# Patient Record
Sex: FEMALE | Race: BLACK OR AFRICAN AMERICAN | NOT HISPANIC OR LATINO | Employment: FULL TIME | URBAN - METROPOLITAN AREA
[De-identification: names, ages, dates, MRNs, and addresses within clinical notes are randomized per-mention and may not be internally consistent; named-entity substitution may affect disease eponyms.]

---

## 2017-03-22 ENCOUNTER — HOSPITAL ENCOUNTER (EMERGENCY)
Facility: HOSPITAL | Age: 32
Discharge: HOME | End: 2017-03-22
Attending: EMERGENCY MEDICINE
Payer: COMMERCIAL

## 2017-03-22 VITALS
RESPIRATION RATE: 19 BRPM | WEIGHT: 205 LBS | SYSTOLIC BLOOD PRESSURE: 119 MMHG | HEIGHT: 65 IN | DIASTOLIC BLOOD PRESSURE: 76 MMHG | BODY MASS INDEX: 34.16 KG/M2 | HEART RATE: 94 BPM | TEMPERATURE: 98 F | OXYGEN SATURATION: 99 %

## 2017-03-22 DIAGNOSIS — R11.0 NAUSEA: ICD-10-CM

## 2017-03-22 DIAGNOSIS — R51.9 HEADACHE IN FRONT OF HEAD: Primary | ICD-10-CM

## 2017-03-22 LAB
ALBUMIN SERPL BCP-MCNC: 4.3 G/DL
ALP SERPL-CCNC: 69 U/L
ALT SERPL W/O P-5'-P-CCNC: 17 U/L
ANION GAP SERPL CALC-SCNC: 11 MMOL/L
AST SERPL-CCNC: 17 U/L
B-HCG UR QL: NEGATIVE
BASOPHILS # BLD AUTO: 0.03 K/UL
BASOPHILS NFR BLD: 0.4 %
BILIRUB SERPL-MCNC: 0.3 MG/DL
BILIRUB UR QL STRIP: NEGATIVE
BUN SERPL-MCNC: 8 MG/DL
CALCIUM SERPL-MCNC: 9.7 MG/DL
CHLORIDE SERPL-SCNC: 109 MMOL/L
CLARITY UR: CLEAR
CO2 SERPL-SCNC: 19 MMOL/L
COLOR UR: YELLOW
CREAT SERPL-MCNC: 0.9 MG/DL
CTP QC/QA: YES
DIFFERENTIAL METHOD: ABNORMAL
EOSINOPHIL # BLD AUTO: 0.1 K/UL
EOSINOPHIL NFR BLD: 1.1 %
ERYTHROCYTE [DISTWIDTH] IN BLOOD BY AUTOMATED COUNT: 13 %
EST. GFR  (AFRICAN AMERICAN): >60 ML/MIN/1.73 M^2
EST. GFR  (NON AFRICAN AMERICAN): >60 ML/MIN/1.73 M^2
GLUCOSE SERPL-MCNC: 75 MG/DL
GLUCOSE UR QL STRIP: NEGATIVE
HCT VFR BLD AUTO: 44.3 %
HGB BLD-MCNC: 14.6 G/DL
HGB UR QL STRIP: NEGATIVE
KETONES UR QL STRIP: NEGATIVE
LEUKOCYTE ESTERASE UR QL STRIP: NEGATIVE
LYMPHOCYTES # BLD AUTO: 0.9 K/UL
LYMPHOCYTES NFR BLD: 12 %
MCH RBC QN AUTO: 30.4 PG
MCHC RBC AUTO-ENTMCNC: 33 %
MCV RBC AUTO: 92 FL
MONOCYTES # BLD AUTO: 0.3 K/UL
MONOCYTES NFR BLD: 3.4 %
NEUTROPHILS # BLD AUTO: 6.1 K/UL
NEUTROPHILS NFR BLD: 82.8 %
NITRITE UR QL STRIP: NEGATIVE
PH UR STRIP: 7 [PH] (ref 5–8)
PLATELET # BLD AUTO: 217 K/UL
PMV BLD AUTO: 11 FL
POCT GLUCOSE: 92 MG/DL (ref 70–110)
POTASSIUM SERPL-SCNC: 4.3 MMOL/L
PROT SERPL-MCNC: 8.2 G/DL
PROT UR QL STRIP: NEGATIVE
RBC # BLD AUTO: 4.8 M/UL
SODIUM SERPL-SCNC: 139 MMOL/L
SP GR UR STRIP: 1.02 (ref 1–1.03)
URN SPEC COLLECT METH UR: NORMAL
UROBILINOGEN UR STRIP-ACNC: NEGATIVE EU/DL
WBC # BLD AUTO: 7.34 K/UL

## 2017-03-22 PROCEDURE — 80053 COMPREHEN METABOLIC PANEL: CPT

## 2017-03-22 PROCEDURE — 85025 COMPLETE CBC W/AUTO DIFF WBC: CPT

## 2017-03-22 PROCEDURE — 81003 URINALYSIS AUTO W/O SCOPE: CPT

## 2017-03-22 PROCEDURE — 99283 EMERGENCY DEPT VISIT LOW MDM: CPT

## 2017-03-22 PROCEDURE — 82962 GLUCOSE BLOOD TEST: CPT

## 2017-03-22 PROCEDURE — 81025 URINE PREGNANCY TEST: CPT | Performed by: EMERGENCY MEDICINE

## 2017-03-22 RX ORDER — ONDANSETRON 4 MG/1
4 TABLET, FILM COATED ORAL EVERY 8 HOURS PRN
Qty: 12 TABLET | Refills: 0 | Status: SHIPPED | OUTPATIENT
Start: 2017-03-22 | End: 2019-09-06

## 2017-03-22 NOTE — ED TRIAGE NOTES
Pt arrives to ED via personal transportation with c/o urinary frequency, blurry vision, nausea and chills that began today. Also reports headache pta that has since resolved. Denies any other symptoms at this time.

## 2017-03-22 NOTE — DISCHARGE INSTRUCTIONS
Zofran for nausea.  Regular meals.  Lots of liquids.  Please follow-up with primary care doctor this week.  Rest.

## 2017-03-22 NOTE — ED PROVIDER NOTES
"Encounter Date: 3/22/2017       History     Chief Complaint   Patient presents with    Nausea     Pt reports feelings of nausea and dizziness since this morning. "I don't know if I may be a diabetic but after I drank coke and coffee this morning I started feeling dizzy and sick."      Review of patient's allergies indicates:  No Known Allergies  HPI   This 31-year-old female presents to the emergency room complaining of nausea and dizziness with some frontal head pain that started this morning after drinking a cup of coffee".  She is concerned that she may be diabetic.  She also had some dizziness.  She is essentially otherwise well she treated herself with aspirin for headache.  It is now gone.  Patient did have chills.  She is without other injuries or problems she reports that she is urinating a lot and she does have blurred vision.  History reviewed. No pertinent past medical history.  History reviewed. No pertinent surgical history.  Family History   Problem Relation Age of Onset    Diabetes Father     Hypertension Sister      Social History   Substance Use Topics    Smoking status: Former Smoker     Packs/day: 0.00    Smokeless tobacco: None    Alcohol use Yes      Comment: occasionally     Review of Systems  The patient was questioned specifically with regard to the following.  General: Fever, chills, sweats. Neuro: Headache. Eyes: eye problems. ENT: Ear pain, sore throat. Cardiovascular: Chest pain. Respiratory: Cough, shortness of breath. Gastrointestinal: Abdominal pain, vomiting, diarrhea. Genitourinary: Painful urination.  Musculoskeletal: Arm and leg problems. Skin: Rash.  The review of systems was negative except for the following: Headache, blurred vision, chills, dizziness, nausea  Physical Exam   Initial Vitals   BP Pulse Resp Temp SpO2   03/22/17 1317 03/22/17 1317 03/22/17 1317 03/22/17 1317 03/22/17 1317   117/79 92 18 98.1 °F (36.7 °C) 99 %     Physical Exam  The patient was examined " specifically for the following:   General:No significant distress, Good color, Warm and dry. Head and neck:Scalp atraumatic, Neck supple. Neurological:Appropriate conversation, Gross motor deficits. Eyes:Conjugate gaze, Clear corneas. ENT: No epistaxis. Cardiac: Regular rate and rhythm, Grossly normal heart tones. Pulmonary: Wheezing, Rales. Gastrointestinal: Abdominal tenderness, Abdominal distention. Musculoskeletal: Extremity deformity, Apparent pain with range of motion of the joints. Skin: Rash.   The findings on examination were normal .  Mental status examination, cranial nerves, motor and sensory examination are grossly unremarkable.  The neck is supple.  Conjunctiva and cornea are clear.  The abdomen is soft.  ED Course   Procedures  Labs Reviewed   COMPREHENSIVE METABOLIC PANEL - Abnormal; Notable for the following:        Result Value    CO2 19 (*)     All other components within normal limits   CBC W/ AUTO DIFFERENTIAL - Abnormal; Notable for the following:     Lymph # 0.9 (*)     Gran% 82.8 (*)     Lymph% 12.0 (*)     Mono% 3.4 (*)     All other components within normal limits   URINALYSIS   POCT URINE PREGNANCY   POCT GLUCOSE       medical decision making: The patient's laboratory work is essentially unremarkable.  I will discharge her to outpatient evaluation and treatment with primary care there is no evidence of diabetes anemia hepatic or renal failure.                          ED Course     Clinical Impression:   The primary encounter diagnosis was Headache in front of head. A diagnosis of Nausea was also pertinent to this visit.          Peter Paredes MD  03/24/17 1918

## 2017-03-22 NOTE — ED AVS SNAPSHOT
[facility]  [place] [address]               Kina Falcon   3/22/2017  2:14 PM   ED    Description:  Female : 1985   Department:  [facility]           Your Care was Coordinated By:     Provider Role From To    Peter Paredes MD Attending Provider [phone] --      Reason for Visit     Nausea           Diagnoses this Visit        Comments    Headache in front of head    -  Primary     Nausea           ED Disposition     None           To Do List           Follow-up Information     Follow up with Princess MARKO Ordaz MD In 3 days.    Specialties:  Internal Medicine, Pediatrics    Contact information:    [address]  [address]-  [facility] [address]  [phone]         These Medications        Disp Refills Start End    ondansetron (ZOFRAN) 4 MG tablet 12 tablet 0 3/22/2017     Take 1 tablet (4 mg total) by mouth every 8 (eight) hours as needed (Nausea and vomiting). - Oral    Pharmacy: [facility] [address] [place] [address] -  [address] AT [facility] [address] Ph #: [phone]         [facility]     [facility] Care Line -  Assistance  Registered nurses in the [facility] provide clinical advisement, health education, appointment booking, and other advisory services.  Call for this free service at [phone].             Medications           Message regarding Medications     Verify the changes and/or additions to your medication regime listed below are the same as discussed with your clinician today.  If any of these changes or additions are incorrect, please notify your healthcare provider.        START taking these NEW medications        Refills    ondansetron (ZOFRAN) 4 MG tablet 0    Sig: Take 1 tablet (4 mg total) by mouth every 8 (eight) hours as needed (Nausea and vomiting).    Class: Print    Route: Oral           Verify that the below list of medications is  "an accurate representation of the medications you are currently taking.  If none reported, the list may be blank. If incorrect, please contact your healthcare provider. Carry this list with you in case of emergency.           Current Medications     ondansetron (ZOFRAN) 4 MG tablet Take 1 tablet (4 mg total) by mouth every 8 (eight) hours as needed (Nausea and vomiting).           Clinical Reference Information           Your Vitals Were     BP Pulse Temp Resp Height Weight    117/79 (BP Location: Right arm, Patient Position: Sitting) 92 98.1 °F (36.7 °C) (Oral) 18 5' 5" (1.651 m) 93 kg (205 lb)    SpO2 BMI             99% 34.11 kg/m2         Allergies as of 3/22/2017     No Known Allergies      Immunizations Administered on Date of Encounter - 3/22/2017     None      ED Micro, Lab, POCT     Start Ordered       Status Ordering Provider    03/22/17 1418 03/22/17 1418  POCT glucose  Once      Acknowledged     03/22/17 1418 03/22/17 1418  Comprehensive metabolic panel  STAT      Final result     03/22/17 1418 03/22/17 1418  CBC auto differential  STAT      Final result     03/22/17 1418 03/22/17 1418  Urinalysis  STAT      Final result     03/22/17 1344 03/22/17 1343  POCT urine pregnancy  Once      Final result     03/22/17 1322 03/22/17 1322  POCT glucose  Once      Final result       ED Imaging Orders     None        Discharge Instructions       Zofran for nausea.  Regular meals.  Lots of liquids.  Please follow-up with primary care doctor this week.  Rest.    Smoking Cessation     If you would like to quit smoking:   You may be eligible for free services if you are a [place] resident and started smoking cigarettes before September 1, 1988.  Call the [employer]oking [employer]ess[employer] ([employer]) toll free at (200) 233-7554 or ([phone] 005-1853.   Call 6-800-QUIT-NOW if you do not meet the above criteria.             [facility] complies with applicable Federal civil rights laws and does not discriminate on the " basis of race, color, national origin, age, disability, or sex.        Language Assistance Services     ATTENTION: Language assistance services are available, free of charge. Please call [phone].      ATENCIÓN: Si habla rajan, tiene a braga disposición servicios gratuitos de asistencia lingüística. Llame al [phone].     CHÚ Ý: N?u b?n nói Ti?ng Vi?t, có các d?ch v? h? tr? ngôn ng? mi?n phí dành cho b?n. G?i s? [phone].

## 2017-06-18 ENCOUNTER — HOSPITAL ENCOUNTER (EMERGENCY)
Facility: HOSPITAL | Age: 32
Discharge: HOME | End: 2017-06-18
Attending: EMERGENCY MEDICINE
Payer: MEDICAID

## 2017-06-18 VITALS
WEIGHT: 209 LBS | HEIGHT: 66 IN | BODY MASS INDEX: 33.59 KG/M2 | SYSTOLIC BLOOD PRESSURE: 125 MMHG | TEMPERATURE: 99 F | HEART RATE: 98 BPM | OXYGEN SATURATION: 100 % | RESPIRATION RATE: 20 BRPM | DIASTOLIC BLOOD PRESSURE: 74 MMHG

## 2017-06-18 DIAGNOSIS — R10.9 ABDOMINAL CRAMPING: Primary | ICD-10-CM

## 2017-06-18 DIAGNOSIS — N94.3 PREMENSTRUAL SYNDROME: ICD-10-CM

## 2017-06-18 LAB
B-HCG UR QL: NEGATIVE
BILIRUB UR QL STRIP: NEGATIVE
CLARITY UR: CLEAR
COLOR UR: YELLOW
CTP QC/QA: YES
GLUCOSE UR QL STRIP: NEGATIVE
HGB UR QL STRIP: NEGATIVE
KETONES UR QL STRIP: NEGATIVE
LEUKOCYTE ESTERASE UR QL STRIP: NEGATIVE
NITRITE UR QL STRIP: NEGATIVE
PH UR STRIP: 7 [PH] (ref 5–8)
PROT UR QL STRIP: NEGATIVE
SP GR UR STRIP: 1.01 (ref 1–1.03)
URN SPEC COLLECT METH UR: NORMAL
UROBILINOGEN UR STRIP-ACNC: NEGATIVE EU/DL

## 2017-06-18 PROCEDURE — 81025 URINE PREGNANCY TEST: CPT | Performed by: EMERGENCY MEDICINE

## 2017-06-18 PROCEDURE — 99283 EMERGENCY DEPT VISIT LOW MDM: CPT | Mod: 25

## 2017-06-18 PROCEDURE — 25000003 PHARM REV CODE 250: Performed by: PHYSICIAN ASSISTANT

## 2017-06-18 PROCEDURE — 81003 URINALYSIS AUTO W/O SCOPE: CPT

## 2017-06-18 RX ORDER — IBUPROFEN 600 MG/1
600 TABLET ORAL EVERY 6 HOURS PRN
Qty: 20 TABLET | Refills: 0 | Status: SHIPPED | OUTPATIENT
Start: 2017-06-18 | End: 2017-06-18

## 2017-06-18 RX ORDER — IBUPROFEN 600 MG/1
600 TABLET ORAL EVERY 6 HOURS PRN
Qty: 20 TABLET | Refills: 0 | Status: SHIPPED | OUTPATIENT
Start: 2017-06-18 | End: 2018-04-30 | Stop reason: SDUPTHER

## 2017-06-18 RX ORDER — IBUPROFEN 600 MG/1
600 TABLET ORAL
Status: COMPLETED | OUTPATIENT
Start: 2017-06-18 | End: 2017-06-18

## 2017-06-18 RX ADMIN — IBUPROFEN 600 MG: 600 TABLET, FILM COATED ORAL at 11:06

## 2017-06-18 NOTE — ED TRIAGE NOTES
Lower abdominal cramping started yesterday morning. . No dysuria reported. LMP 5/24/17. Spotting last week, none since.

## 2017-06-18 NOTE — ED PROVIDER NOTES
"Encounter Date: 6/18/2017    SCRIBE #1 NOTE: IBridgett am scribing for, and in the presence of,  Kathy Eduardo PA-C . I have scribed the following portions of the note - Other sections scribed: HPI and ROS .       History     Chief Complaint   Patient presents with    Abdominal Pain     lower abdominal cramping x 2 days; LMP May 24; denies N/V/D, subjective fever last night; denies dysuria     Review of patient's allergies indicates:  No Known Allergies  CC: Abdominal Pain (lower abdominal cramping x 2 days; LMP May 24; denies N/V/D, subjective fever last night; denies dysuria).  History obtained from patient.  Pt arrived via personal transportation.     HPI: This 32 y.o. Female, who has no pertinent PMHx, presents to the ED for evaluation of intermittent suprapubic abdominal cramping since yesterday. Episodes last 5-10 minutes. She has had "1 drop" of blood from vagina since onset of abdominal pain and denies any copious vaginal bleeding. She states she has had similar episodes for the past three months. She further complains of breast pain, but notes this is consistent with upcoming menstrual cycle. LMP was 5/24/17. Pain was 8/10 last night, thus she took Ibuprofen PM at 12:30 am. This improved pain to 4/10. She denies fever, chills, nausea, vomiting, constipation, dysuria, abnormal vaginal discharge, or urinary frequency. LBM was yesterday and normal. She reports that she has had a pelvic exam for abdominal cramping in the past, but reports no abnormal findings. She has no history of abdominal surgeries.        The history is provided by the patient. No  was used.     History reviewed. No pertinent past medical history.  Past Surgical History:   Procedure Laterality Date    HERNIA REPAIR       Family History   Problem Relation Age of Onset    Diabetes Father     Hypertension Sister      Social History   Substance Use Topics    Smoking status: Former Smoker     Packs/day: 0.00    " Smokeless tobacco: Not on file    Alcohol use Yes      Comment: occasionally     Review of Systems   Constitutional: Negative for chills and fever.   HENT: Negative for rhinorrhea.    Eyes: Negative for redness.   Respiratory: Negative for cough.    Cardiovascular: Negative for chest pain.   Gastrointestinal: Positive for abdominal pain. Negative for constipation, diarrhea, nausea and vomiting.   Genitourinary: Positive for vaginal bleeding. Negative for difficulty urinating, dysuria and frequency.   Musculoskeletal:        (+) breast pain    Skin: Negative for rash.   Neurological: Negative for dizziness and headaches.       Physical Exam     Initial Vitals [06/18/17 1026]   BP Pulse Resp Temp SpO2   125/74 98 20 98.9 °F (37.2 °C) 100 %     Physical Exam    Nursing note and vitals reviewed.  Constitutional: She appears well-developed and well-nourished.   HENT:   Head: Normocephalic.   Right Ear: External ear normal.   Left Ear: External ear normal.   Nose: Nose normal.   Mouth/Throat: Oropharynx is clear and moist.   Eyes: Conjunctivae are normal.   Cardiovascular: Normal rate and regular rhythm. Exam reveals no gallop and no friction rub.    No murmur heard.  Pulmonary/Chest: Breath sounds normal. She has no wheezes. She has no rhonchi. She has no rales. Right breast exhibits tenderness. Right breast exhibits no skin change. Left breast exhibits tenderness. Left breast exhibits no skin change. There is no breast swelling.   Abdominal: Soft. Bowel sounds are normal. She exhibits no distension. There is tenderness in the suprapubic area. There is no rigidity, no rebound, no guarding, no CVA tenderness, no tenderness at McBurney's point and negative Gusman's sign.   Musculoskeletal: Normal range of motion.   Lymphadenopathy:     She has no cervical adenopathy.   Neurological: She is alert. She has normal strength. No cranial nerve deficit or sensory deficit.   Skin: Skin is warm and dry.   Psychiatric: She has a  normal mood and affect.         ED Course   Procedures  Labs Reviewed   URINALYSIS   POCT URINE PREGNANCY             Medical Decision Making:   Initial Assessment:   Patient is a 32-year-old female who presents for evaluation of intermittent suprapubic abdominal cramping and bilateral breast pain since yesterday. Pt has had similar episodes for the past three months prior to her menstrual cycles. She denies F/C, NVD, constipation, dysuria, vaginal discharge, urinary frequency or vaginal bleeding.  Patient is afebrile, well-appearing in no acute distress.  On exam, there is tenderness to palpation of the suprapubic region with no peritoneal signs. Diffuse TTP of bilateral breasts. UPT negative.  UA negative for infection. Pt denies any vaginal discharge- I doubt PID or STI. No history of abdominal surgeries, I considered but doubt bowel obstruction or acute surgical abdomen. Based on history and physical exam, I think this is premenstrual syndrome.  Patient was given ibuprofen in ED.  Discharge patient to home with ibuprofen.  OB/GYN follow-up in 2 days for further evaluation and management.  Return to the ER if develop worsening symptoms, heavy vaginal bleeding, dizziness, lightheadedness or vaginal discharge as needed.  I discussed this patient with Dr. Chowdhury and he agrees with the assessment and plan.             Scribe Attestation:   Scribe #1: I performed the above scribed service and the documentation accurately describes the services I performed. I attest to the accuracy of the note.    Attending Attestation:     Physician Attestation Statement for NP/PA:   I discussed this assessment and plan of this patient with the NP/PA, but I did not personally examine the patient. The face to face encounter was performed by the NP/PA.    Other NP/PA Attestation Additions:      Medical Decision Makin-year-old female presents complaining of suprapubic abdominal cramping and bilateral breast pain.  Physical  examination does reveal mild suprapubic tenderness and bilateral breast tenderness, but no other significant abnormalities.  Urinalysis unremarkable. Agree with above assessment and plan.          Physician Attestation for Scribe:  Physician Attestation Statement for Scribe #1: I, Kathy Eduardo PA-C, reviewed documentation, as scribed by Bridgett Carmona  in my presence, and it is both accurate and complete.                 ED Course     Clinical Impression:   The primary encounter diagnosis was Abdominal cramping. A diagnosis of Premenstrual syndrome was also pertinent to this visit.          Kathy Eduardo PA-C  06/18/17 2218       Peter Chowdhury III, MD  07/08/17 0003

## 2017-06-18 NOTE — DISCHARGE INSTRUCTIONS
You can take Ibuprofen as needed for your abdominal cramping. This is likely related to your menstrual cycle.    Please make follow up appointment with OBGYN for follow up in 2 days.     Return to ER if you develop worsening symptoms, heavy vaginal bleeding, if pain moves to right lower part of your abdomen, any new symptoms or as needed.

## 2017-07-20 ENCOUNTER — HOSPITAL ENCOUNTER (EMERGENCY)
Facility: HOSPITAL | Age: 32
Discharge: HOME | End: 2017-07-20
Attending: EMERGENCY MEDICINE
Payer: MEDICAID

## 2017-07-20 VITALS
TEMPERATURE: 99 F | SYSTOLIC BLOOD PRESSURE: 127 MMHG | OXYGEN SATURATION: 100 % | BODY MASS INDEX: 33.27 KG/M2 | WEIGHT: 207 LBS | HEIGHT: 66 IN | RESPIRATION RATE: 16 BRPM | HEART RATE: 81 BPM | DIASTOLIC BLOOD PRESSURE: 83 MMHG

## 2017-07-20 DIAGNOSIS — H10.11 ALLERGIC CONJUNCTIVITIS, RIGHT: Primary | ICD-10-CM

## 2017-07-20 LAB
B-HCG UR QL: NEGATIVE
CTP QC/QA: YES

## 2017-07-20 PROCEDURE — 99283 EMERGENCY DEPT VISIT LOW MDM: CPT

## 2017-07-20 PROCEDURE — 81025 URINE PREGNANCY TEST: CPT | Performed by: NURSE PRACTITIONER

## 2017-07-20 RX ORDER — KETOTIFEN FUMARATE 0.35 MG/ML
1 SOLUTION/ DROPS OPHTHALMIC 2 TIMES DAILY
Refills: 0 | COMMUNITY
Start: 2017-07-20 | End: 2019-09-06

## 2017-07-20 NOTE — DISCHARGE INSTRUCTIONS
Return to the Emergency department for any worsening or failure to improve, otherwise follow up with your primary care provider.

## 2017-07-20 NOTE — ED TRIAGE NOTES
Blurry vision right eye has had pain for a week today had yellow discharge feels like trash in eye

## 2017-07-20 NOTE — ED PROVIDER NOTES
Encounter Date: 7/20/2017       History     Chief Complaint   Patient presents with    Eye Pain     pain onset for several days, states I woke up this morning and I had eye jelly in it, feels like something in eye     CC: eye discomfort  HPI: Patient is a 32-year-old female who presents to the emergency department 2 days of right eye discomfort that is constant she reports that it feels as though there is a foreign body in the eye but she reports no photophobia she does report itching.  She also reports a sensation of jelly in the eye, but when questioned deeply about this demonstrates chemosis.    The history is provided by the patient. No  was used.     Review of patient's allergies indicates:  No Known Allergies  History reviewed. No pertinent past medical history.  Past Surgical History:   Procedure Laterality Date    HERNIA REPAIR       Family History   Problem Relation Age of Onset    Diabetes Father     Hypertension Sister      Social History   Substance Use Topics    Smoking status: Former Smoker     Packs/day: 0.00    Smokeless tobacco: Never Used    Alcohol use Yes      Comment: occasionally     Review of Systems   Constitutional: Negative for chills, fatigue and fever.   HENT: Negative for congestion, ear discharge, ear pain, postnasal drip, rhinorrhea, sinus pressure, sneezing, sore throat and voice change.    Eyes: Positive for pain and itching. Negative for photophobia, discharge, redness and visual disturbance.   Respiratory: Negative for cough, shortness of breath and wheezing.    Cardiovascular: Negative for chest pain, palpitations and leg swelling.   Gastrointestinal: Negative for abdominal pain, constipation, diarrhea, nausea and vomiting.   Endocrine: Negative for polydipsia, polyphagia and polyuria.   Genitourinary: Negative for dysuria, frequency, hematuria, urgency, vaginal bleeding, vaginal discharge and vaginal pain.   Musculoskeletal: Negative for arthralgias and  myalgias.   Skin: Negative for rash and wound.   Neurological: Negative for dizziness, seizures, syncope, weakness and numbness.   Hematological: Negative for adenopathy. Does not bruise/bleed easily.   Psychiatric/Behavioral: Negative for self-injury and suicidal ideas. The patient is not nervous/anxious.        Physical Exam     Initial Vitals [07/20/17 1149]   BP Pulse Resp Temp SpO2   127/83 81 16 99 °F (37.2 °C) 100 %      MAP       97.67         Physical Exam    Nursing note and vitals reviewed.  Constitutional: She appears well-developed and well-nourished.   HENT:   Head: Normocephalic and atraumatic.   Right Ear: External ear normal.   Left Ear: External ear normal.   Nose: Nose normal. No epistaxis.   Mouth/Throat: Oropharynx is clear and moist.   Eyes: Conjunctivae and EOM are normal. Pupils are equal, round, and reactive to light. Right eye exhibits chemosis. Right eye exhibits no discharge, no exudate and no hordeolum. No foreign body present in the right eye. Left eye exhibits no chemosis, no discharge, no exudate and no hordeolum. No foreign body present in the left eye. Right conjunctiva is not injected. Right conjunctiva has no hemorrhage. Left conjunctiva is not injected. Left conjunctiva has no hemorrhage. No scleral icterus. Right eye exhibits normal extraocular motion and no nystagmus. Left eye exhibits normal extraocular motion and no nystagmus. Right pupil is round and reactive. Left pupil is round and reactive. Pupils are equal.   Neck: Normal range of motion.   Abdominal: She exhibits no distension.   Musculoskeletal: Normal range of motion.   Neurological: She is alert and oriented to person, place, and time.   Skin: Skin is dry. Capillary refill takes less than 2 seconds.         ED Course   Procedures  Labs Reviewed   POCT URINE PREGNANCY             Medical Decision Making:   Initial Assessment:   32-year-old female presents with right eye discomfort, itching, foreign body  sensation  Differential Diagnosis:   ALLERGIC conjunctivitis, corneal abrasion, foreign body  ED Management:  Following a thorough history and physical, it was determined that the most likely diagnosis was ALLERGIC conjunctivitis because the patient did not have photophobia she was able to keep her eye open without frequent blinking, rubbing, tearing, or other symptoms of persistent foreign body or photophobia symptomatic of laceration or corneal abrasion.  She was discharged home with recommendation for [facility] following of visual screening.  Other:   I have discussed this case with another health care provider.       <> Summary of the Discussion: Care of the patient discussed with Dr. Maurice who agreed with the assessment and plan.                       ED Course     Clinical Impression:   The encounter diagnosis was Allergic conjunctivitis, right.    Disposition:   Disposition: Discharged  Condition: Stable                        Jeferson Araujo, LEIA  07/20/17 1233

## 2017-11-27 ENCOUNTER — HOSPITAL ENCOUNTER (EMERGENCY)
Facility: OTHER | Age: 32
Discharge: HOME | End: 2017-11-27
Attending: EMERGENCY MEDICINE
Payer: COMMERCIAL

## 2017-11-27 VITALS
TEMPERATURE: 97 F | HEIGHT: 65 IN | DIASTOLIC BLOOD PRESSURE: 77 MMHG | WEIGHT: 204 LBS | OXYGEN SATURATION: 100 % | RESPIRATION RATE: 18 BRPM | BODY MASS INDEX: 33.99 KG/M2 | SYSTOLIC BLOOD PRESSURE: 130 MMHG | HEART RATE: 99 BPM

## 2017-11-27 DIAGNOSIS — J30.1 ACUTE SEASONAL ALLERGIC RHINITIS DUE TO POLLEN: Primary | ICD-10-CM

## 2017-11-27 PROCEDURE — 99283 EMERGENCY DEPT VISIT LOW MDM: CPT

## 2017-11-27 RX ORDER — PREDNISONE 10 MG/1
10 TABLET ORAL DAILY
Qty: 5 TABLET | Refills: 0 | Status: SHIPPED | OUTPATIENT
Start: 2017-11-27 | End: 2017-12-02

## 2017-11-27 NOTE — ED PROVIDER NOTES
Encounter Date: 11/27/2017       History     Chief Complaint   Patient presents with    URI     reports cold symptoms and nasa; congestion x 2 days     The history is provided by the patient.   URI   The primary symptoms include sore throat and cough. Primary symptoms do not include fever, fatigue, headaches, ear pain, swollen glands, wheezing, abdominal pain, nausea, vomiting, myalgias, arthralgias or rash. The current episode started yesterday. This is a new problem.   The sore throat began yesterday. The sore throat has been unchanged since its onset. The sore throat is not accompanied by trouble swallowing, drooling, hoarse voice or stridor. The sore throat pain is at a severity of 2/10.   The cough began yesterday. The cough is non-productive. It is exacerbated by allergens.   Symptoms associated with the illness include congestion and rhinorrhea. The illness is not associated with chills, plugged ear sensation, facial pain or sinus pressure.     Review of patient's allergies indicates:  No Known Allergies  History reviewed. No pertinent past medical history.  Past Surgical History:   Procedure Laterality Date    HERNIA REPAIR       Family History   Problem Relation Age of Onset    Diabetes Father     Hypertension Sister      Social History   Substance Use Topics    Smoking status: Former Smoker     Packs/day: 0.00    Smokeless tobacco: Never Used    Alcohol use Yes      Comment: occasionally     Review of Systems   Constitutional: Negative.  Negative for chills, fatigue and fever.   HENT: Positive for congestion, rhinorrhea and sore throat. Negative for drooling, ear pain, hoarse voice, sinus pressure and trouble swallowing.    Eyes: Negative.    Respiratory: Positive for cough. Negative for wheezing and stridor.    Cardiovascular: Negative.    Gastrointestinal: Negative.  Negative for abdominal pain, nausea and vomiting.   Endocrine: Negative.    Genitourinary: Negative.    Musculoskeletal: Negative.   Negative for arthralgias and myalgias.   Skin: Negative.  Negative for rash.   Allergic/Immunologic: Negative.    Neurological: Negative.  Negative for headaches.   Hematological: Negative.    Psychiatric/Behavioral: Negative.    All other systems reviewed and are negative.      Physical Exam     Initial Vitals [11/27/17 1131]   BP Pulse Resp Temp SpO2   130/77 (!) 111 18 97.1 °F (36.2 °C) 100 %      MAP       94.67         Physical Exam    Nursing note and vitals reviewed.  Constitutional: Vital signs are normal. She appears well-developed. She is active and cooperative.   HENT:   Head: Normocephalic and atraumatic.   Right Ear: Tympanic membrane normal.   Left Ear: Tympanic membrane normal.   Nose: Mucosal edema and rhinorrhea present.   Mouth/Throat: Uvula is midline, oropharynx is clear and moist and mucous membranes are normal.   Eyes: Conjunctivae, EOM and lids are normal. Pupils are equal, round, and reactive to light.   Neck: Trachea normal and full passive range of motion without pain. Neck supple. No thyroid mass present.   Cardiovascular: Normal rate, regular rhythm, S1 normal, S2 normal, normal heart sounds, intact distal pulses and normal pulses.   Pulmonary/Chest: Effort normal and breath sounds normal.   Abdominal: Soft. Normal appearance, normal aorta and bowel sounds are normal.   Musculoskeletal: Normal range of motion.   Lymphadenopathy:     She has no axillary adenopathy.   Neurological: She is alert and oriented to person, place, and time.   Skin: Skin is warm, dry and intact.   Psychiatric: She has a normal mood and affect. Her speech is normal and behavior is normal. Judgment and thought content normal. Cognition and memory are normal.         ED Course   Procedures  Labs Reviewed - No data to display                            ED Course      Clinical Impression:   The encounter diagnosis was Acute seasonal allergic rhinitis due to pollen.                           Steve Pinto,  MD  11/27/17 1449

## 2018-04-30 ENCOUNTER — HOSPITAL ENCOUNTER (EMERGENCY)
Facility: HOSPITAL | Age: 33
Discharge: HOME | End: 2018-04-30
Attending: INTERNAL MEDICINE
Payer: MEDICAID

## 2018-04-30 VITALS
HEIGHT: 64 IN | HEART RATE: 101 BPM | OXYGEN SATURATION: 100 % | BODY MASS INDEX: 34.15 KG/M2 | RESPIRATION RATE: 18 BRPM | SYSTOLIC BLOOD PRESSURE: 149 MMHG | TEMPERATURE: 99 F | WEIGHT: 200 LBS | DIASTOLIC BLOOD PRESSURE: 91 MMHG

## 2018-04-30 DIAGNOSIS — M54.50 ACUTE BILATERAL LOW BACK PAIN WITHOUT SCIATICA: Primary | ICD-10-CM

## 2018-04-30 LAB
B-HCG UR QL: NEGATIVE
CTP QC/QA: YES

## 2018-04-30 PROCEDURE — 99283 EMERGENCY DEPT VISIT LOW MDM: CPT

## 2018-04-30 PROCEDURE — 81025 URINE PREGNANCY TEST: CPT | Performed by: INTERNAL MEDICINE

## 2018-04-30 RX ORDER — IBUPROFEN 800 MG/1
800 TABLET ORAL EVERY 8 HOURS PRN
Qty: 30 TABLET | Refills: 0 | Status: SHIPPED | OUTPATIENT
Start: 2018-04-30 | End: 2018-07-10

## 2018-04-30 NOTE — ED PROVIDER NOTES
Encounter Date: 4/30/2018       History     Chief Complaint   Patient presents with    Back Pain     pt c/o Back pain that increases with SOB, pt cough and pain with deep breathing     32-year-old female presents to the emergency department complaining of low back pain since going to a pool party over the weekend.  Denies urinary frequency, urgency or dysuria.  She states pain is worse when she rotates her trunk right or left.      The history is provided by the patient. No  was used.   Back Pain    This is a new problem. The current episode started two days ago. The problem occurs intermittently. The problem has been unchanged. Associated with: Swimming. The pain is present in the lumbar spine. The quality of the pain is described as aching. The pain does not radiate. The pain is at a severity of 3/10. The symptoms are aggravated by bending. The pain is the same all the time. Pertinent negatives include no chest pain. She has tried nothing for the symptoms. Risk factors include obesity.     Review of patient's allergies indicates:  No Known Allergies  History reviewed. No pertinent past medical history.  Past Surgical History:   Procedure Laterality Date    HERNIA REPAIR       Family History   Problem Relation Age of Onset    Diabetes Father     Hypertension Sister      Social History   Substance Use Topics    Smoking status: Former Smoker     Packs/day: 0.00    Smokeless tobacco: Never Used    Alcohol use Yes      Comment: occasionally     Review of Systems   Cardiovascular: Negative for chest pain.   Musculoskeletal: Positive for back pain.   All other systems reviewed and are negative.      Physical Exam     Initial Vitals [04/30/18 0639]   BP Pulse Resp Temp SpO2   (!) 149/91 101 18 98.5 °F (36.9 °C) 100 %      MAP       110.33         Physical Exam    Nursing note reviewed.  Constitutional: She appears well-developed and well-nourished.   HENT:   Head: Normocephalic and atraumatic.    Eyes: EOM are normal.   Neck: Normal range of motion.   Cardiovascular: Normal rate and regular rhythm.   Pulmonary/Chest: Breath sounds normal. No respiratory distress.   Abdominal: Soft. Bowel sounds are normal.   Musculoskeletal: Normal range of motion.   Lumbar paraspinal muscle pain upon movement without gross deformity   Neurological: She is alert and oriented to person, place, and time. She has normal strength.   Skin: Skin is warm.         ED Course   Procedures  Labs Reviewed   POCT URINE PREGNANCY             Medical Decision Making:   Initial Assessment:   32-year-old female presents to the emergency department complaining of low back pain since going to a pool party over the weekend.  Denies urinary frequency, urgency or dysuria.  She states pain is worse when she rotates her trunk right or left.  ED Management:  Patient was given instructions for musculoskeletal back pain and a prescription for ibuprofen.  She was advised to follow-up with her primary care physician tomorrow for reevaluation.                      Clinical Impression:   The encounter diagnosis was Acute bilateral low back pain without sciatica.    Disposition:   Disposition: Discharged  Condition: Stable                        Preet Herrera MD  05/07/18 3289

## 2018-07-10 ENCOUNTER — HOSPITAL ENCOUNTER (EMERGENCY)
Facility: HOSPITAL | Age: 33
Discharge: HOME | End: 2018-07-10
Attending: EMERGENCY MEDICINE
Payer: MEDICAID

## 2018-07-10 VITALS
BODY MASS INDEX: 33.99 KG/M2 | TEMPERATURE: 98 F | OXYGEN SATURATION: 100 % | HEIGHT: 65 IN | DIASTOLIC BLOOD PRESSURE: 78 MMHG | WEIGHT: 204 LBS | SYSTOLIC BLOOD PRESSURE: 118 MMHG | RESPIRATION RATE: 18 BRPM | HEART RATE: 94 BPM

## 2018-07-10 DIAGNOSIS — Z72.0 TOBACCO ABUSE: ICD-10-CM

## 2018-07-10 DIAGNOSIS — J01.00 ACUTE MAXILLARY SINUSITIS, RECURRENCE NOT SPECIFIED: Primary | ICD-10-CM

## 2018-07-10 LAB
B-HCG UR QL: NEGATIVE
CTP QC/QA: YES
CTP QC/QA: YES
S PYO RRNA THROAT QL PROBE: NEGATIVE

## 2018-07-10 PROCEDURE — 81025 URINE PREGNANCY TEST: CPT | Performed by: EMERGENCY MEDICINE

## 2018-07-10 PROCEDURE — 99283 EMERGENCY DEPT VISIT LOW MDM: CPT

## 2018-07-10 PROCEDURE — 87880 STREP A ASSAY W/OPTIC: CPT

## 2018-07-10 RX ORDER — IBUPROFEN 600 MG/1
600 TABLET ORAL EVERY 6 HOURS PRN
Qty: 20 TABLET | Refills: 0 | Status: SHIPPED | OUTPATIENT
Start: 2018-07-10 | End: 2019-09-06

## 2018-07-10 RX ORDER — LORATADINE 10 MG/1
10 TABLET ORAL DAILY
Qty: 60 TABLET | Refills: 0 | OUTPATIENT
Start: 2018-07-10 | End: 2019-02-15

## 2018-07-10 RX ORDER — AMOXICILLIN AND CLAVULANATE POTASSIUM 875; 125 MG/1; MG/1
1 TABLET, FILM COATED ORAL 2 TIMES DAILY
Qty: 20 TABLET | Refills: 0 | Status: SHIPPED | OUTPATIENT
Start: 2018-07-10 | End: 2018-07-20

## 2018-07-10 RX ORDER — FLUTICASONE PROPIONATE 50 MCG
1 SPRAY, SUSPENSION (ML) NASAL 2 TIMES DAILY
Qty: 15 G | Refills: 0 | Status: SHIPPED | OUTPATIENT
Start: 2018-07-10 | End: 2018-07-31

## 2018-07-10 NOTE — ED PROVIDER NOTES
Encounter Date: 7/10/2018       History     Chief Complaint   Patient presents with    Sore Throat     pt reports sore throat and cough x 1 week after starting back smoking(1 pack and a half a day)    Cough     x 1 week     33-year-old female chief complaint runny nose, congestion, and sore throat. Patient also reports a nonproductive cough.  Patient states her symptoms started after she return to smoking cigarettes 2 weeks ago.  Patient states she has been very stressed in her life and started smoking again now her sinuses are acting up.  Patient states she has sinus pain and pressure whenever she bends forward.  It hurts a lot when she swallows and there is a burning pain in the back of her throat.          Review of patient's allergies indicates:  No Known Allergies  History reviewed. No pertinent past medical history.  Past Surgical History:   Procedure Laterality Date    HERNIA REPAIR       Family History   Problem Relation Age of Onset    Diabetes Father     Hypertension Sister      Social History   Substance Use Topics    Smoking status: Former Smoker     Packs/day: 0.00    Smokeless tobacco: Never Used    Alcohol use Yes      Comment: occasionally     Review of Systems   Constitutional: Negative for fever.   HENT: Positive for congestion, sinus pain, sinus pressure and sneezing. Negative for sore throat.    Respiratory: Negative for shortness of breath.    Cardiovascular: Negative for chest pain.   Gastrointestinal: Negative for nausea.   Genitourinary: Negative for dysuria.   Musculoskeletal: Negative for back pain.   Skin: Negative for rash.   Neurological: Negative for weakness.   Hematological: Does not bruise/bleed easily.   All other systems reviewed and are negative.      Physical Exam     Initial Vitals [07/10/18 0744]   BP Pulse Resp Temp SpO2   118/78 94 18 97.9 °F (36.6 °C) 100 %      MAP       --         Physical Exam    Nursing note and vitals reviewed.  Constitutional: She appears  well-developed and well-nourished.   HENT:   Head: Normocephalic and atraumatic.   Right Ear: Tympanic membrane and external ear normal.   Left Ear: Tympanic membrane and external ear normal.   Nose: Mucosal edema and rhinorrhea present. Right sinus exhibits maxillary sinus tenderness. Left sinus exhibits maxillary sinus tenderness.   Mouth/Throat: Uvula is midline and oropharynx is clear and moist. No oropharyngeal exudate, posterior oropharyngeal edema or posterior oropharyngeal erythema.   Eyes: Conjunctivae and EOM are normal. Pupils are equal, round, and reactive to light. Right eye exhibits no discharge. Left eye exhibits no discharge.   Neck: Normal range of motion. Neck supple.   Cardiovascular: Normal rate, regular rhythm, normal heart sounds and intact distal pulses. Exam reveals no gallop and no friction rub.    No murmur heard.  Pulmonary/Chest: Breath sounds normal. No respiratory distress. She has no wheezes. She has no rhonchi. She has no rales. She exhibits no tenderness.   Abdominal: Soft. Bowel sounds are normal. She exhibits no distension and no mass. There is no tenderness. There is no rebound and no guarding.   Musculoskeletal: Normal range of motion. She exhibits no edema or tenderness.   Neurological: She is alert and oriented to person, place, and time. She has normal strength and normal reflexes. She displays normal reflexes. No cranial nerve deficit or sensory deficit.   Skin: Skin is warm and dry. No rash and no abscess noted. No erythema. No pallor.   Psychiatric: She has a normal mood and affect.         ED Course   Procedures  Labs Reviewed   POCT URINE PREGNANCY   POCT RAPID STREP A                            Medical decision making   Fill and take prescriptions as directed.  Return to the ED if symptoms worsen or do not resolve.   Answered questions and discussed discharge plan.    Patient feels much better and is ready for discharge.  Follow up with PCP in 1 days.          Clinical  Impression:   The primary encounter diagnosis was Acute maxillary sinusitis, recurrence not specified. A diagnosis of Tobacco abuse was also pertinent to this visit.                             Clare Chow DO  07/11/18 8525

## 2018-08-11 ENCOUNTER — HOSPITAL ENCOUNTER (EMERGENCY)
Facility: HOSPITAL | Age: 33
Discharge: HOME | End: 2018-08-11
Attending: EMERGENCY MEDICINE
Payer: MEDICAID

## 2018-08-11 VITALS
DIASTOLIC BLOOD PRESSURE: 79 MMHG | BODY MASS INDEX: 34.95 KG/M2 | SYSTOLIC BLOOD PRESSURE: 133 MMHG | HEART RATE: 102 BPM | TEMPERATURE: 98 F | WEIGHT: 210 LBS | OXYGEN SATURATION: 99 % | RESPIRATION RATE: 16 BRPM

## 2018-08-11 DIAGNOSIS — J06.9 UPPER RESPIRATORY TRACT INFECTION, UNSPECIFIED TYPE: Primary | ICD-10-CM

## 2018-08-11 LAB
B-HCG UR QL: NEGATIVE
CTP QC/QA: YES

## 2018-08-11 PROCEDURE — 96372 THER/PROPH/DIAG INJ SC/IM: CPT

## 2018-08-11 PROCEDURE — 63600175 PHARM REV CODE 636 W HCPCS: Performed by: NURSE PRACTITIONER

## 2018-08-11 PROCEDURE — 81025 URINE PREGNANCY TEST: CPT | Performed by: EMERGENCY MEDICINE

## 2018-08-11 PROCEDURE — 99284 EMERGENCY DEPT VISIT MOD MDM: CPT | Mod: 25

## 2018-08-11 RX ORDER — CETIRIZINE HYDROCHLORIDE 10 MG/1
10 TABLET ORAL DAILY PRN
Qty: 30 TABLET | Refills: 0 | OUTPATIENT
Start: 2018-08-11 | End: 2019-02-15

## 2018-08-11 RX ORDER — FLUTICASONE PROPIONATE 50 MCG
1 SPRAY, SUSPENSION (ML) NASAL 2 TIMES DAILY PRN
Qty: 15 G | Refills: 0 | OUTPATIENT
Start: 2018-08-11 | End: 2019-02-15

## 2018-08-11 RX ORDER — DEXAMETHASONE SODIUM PHOSPHATE 4 MG/ML
10 INJECTION, SOLUTION INTRA-ARTICULAR; INTRALESIONAL; INTRAMUSCULAR; INTRAVENOUS; SOFT TISSUE
Status: COMPLETED | OUTPATIENT
Start: 2018-08-11 | End: 2018-08-11

## 2018-08-11 RX ORDER — BENZONATATE 100 MG/1
100 CAPSULE ORAL 3 TIMES DAILY PRN
Qty: 30 CAPSULE | Refills: 0 | Status: SHIPPED | OUTPATIENT
Start: 2018-08-11 | End: 2018-08-21

## 2018-08-11 RX ADMIN — DEXAMETHASONE SODIUM PHOSPHATE 10 MG: 4 INJECTION, SOLUTION INTRAMUSCULAR; INTRAVENOUS at 12:08

## 2018-08-11 NOTE — ED PROVIDER NOTES
"  History  Chief Complaint   Patient presents with    URI     Pt states," Congestion, headache for three days. It is worse when I smoke."       History reviewed. No pertinent past medical history.    Past Surgical History:   Procedure Laterality Date    HERNIA REPAIR         Family History   Problem Relation Age of Onset    Diabetes Father     Hypertension Sister        Social History   Substance Use Topics    Smoking status: Heavy Tobacco Smoker     Packs/day: 1.50     Types: Cigarettes    Smokeless tobacco: Never Used    Alcohol use Yes      Comment: occasionally       Physical Exam  /79 (BP Location: Right arm, Patient Position: Sitting)   Pulse 102   Temp 98.4 °F (36.9 °C) (Oral)   Resp 16   Wt 95.3 kg (210 lb)   LMP 07/24/2018 (Approximate)   SpO2 99%   BMI 34.95 kg/m²     ED Course          "

## 2018-08-11 NOTE — ED PROVIDER NOTES
"Encounter Date: 8/11/2018       History     Chief Complaint   Patient presents with    URI     Pt states," Congestion, headache for three days. It is worse when I smoke."     A 33-year-old female who presents to the ED with nasal congestion, cough and headache for 3 days.  Patient denies fever or chills. Patient states the symptoms is worse when she smoke.  Patient has no significant medical history.  Patient denies using any over-the-counter meds.      The history is provided by the patient.   URI   The primary symptoms include cough. Primary symptoms do not include fever, abdominal pain, vomiting or rash. The problem has been gradually worsening.   The cough began 3 to 5 days ago. The cough is productive. It is exacerbated by smoking.   Symptoms associated with the illness include sinus pressure and congestion. The illness is not associated with chills.     Review of patient's allergies indicates:  No Known Allergies  History reviewed. No pertinent past medical history.  Past Surgical History:   Procedure Laterality Date    HERNIA REPAIR       Family History   Problem Relation Age of Onset    Diabetes Father     Hypertension Sister      Social History   Substance Use Topics    Smoking status: Heavy Tobacco Smoker     Packs/day: 1.50     Types: Cigarettes    Smokeless tobacco: Never Used    Alcohol use Yes      Comment: occasionally     Review of Systems   Constitutional: Negative.  Negative for chills and fever.   HENT: Positive for congestion and sinus pressure.    Eyes: Negative.    Respiratory: Positive for cough and shortness of breath. Negative for chest tightness.    Cardiovascular: Negative.  Negative for chest pain.   Gastrointestinal: Negative.  Negative for abdominal pain and vomiting.   Endocrine: Negative.    Genitourinary: Negative.  Negative for dysuria and hematuria.   Musculoskeletal: Negative.  Negative for back pain and neck pain.   Skin: Negative.  Negative for rash. "   Allergic/Immunologic: Negative for immunocompromised state.   Neurological: Negative.  Negative for weakness.   Hematological: Does not bruise/bleed easily.   Psychiatric/Behavioral: Negative.    All other systems reviewed and are negative.      Physical Exam     Initial Vitals [08/11/18 1126]   BP Pulse Resp Temp SpO2   133/79 102 16 98.4 °F (36.9 °C) 99 %      MAP       --         Physical Exam    Nursing note and vitals reviewed.  Constitutional: Vital signs are normal. She appears well-developed. She is cooperative. She does not appear ill.   HENT:   Head: Normocephalic and atraumatic.   Right Ear: Tympanic membrane and external ear normal.   Left Ear: Tympanic membrane and external ear normal.   Nose: Mucosal edema present. Left sinus exhibits frontal sinus tenderness.   Mouth/Throat: Uvula is midline, oropharynx is clear and moist and mucous membranes are normal.   Eyes: Conjunctivae and lids are normal. Pupils are equal, round, and reactive to light.   Neck: Normal range of motion. Neck supple.   Cardiovascular: Normal rate, regular rhythm, S1 normal, S2 normal and normal heart sounds. Exam reveals no gallop.    No murmur heard.  Pulmonary/Chest: Effort normal and breath sounds normal.   Abdominal: Soft. Normal appearance and bowel sounds are normal. There is no tenderness.   Musculoskeletal: Normal range of motion.   Neurological: She is alert and oriented to person, place, and time.   Skin: Skin is warm, dry and intact.   Psychiatric: She has a normal mood and affect. Thought content normal. Cognition and memory are normal.         ED Course   Procedures  Labs Reviewed   POCT URINE PREGNANCY          Imaging Results    None          Medical Decision Making:   Initial Assessment:   A 33-year-old female who presents to the ED with nasal congestion, cough and sinus pressure x3 days.  Patient denies fever or chills. Patient states symptoms are exacerbated by her smoking.  Patient has not tried  over-the-counter meds.  Patient recently treated for a sinus infection last month.  Differential Diagnosis:   Allergic rhinitis  Acute sinusitis  Upper respiratory infection  Clinical Tests:   Lab Tests: Ordered and Reviewed  ED Management:  Physical exam.  UPT negative.  Decadron 10 mg IM.  Discharge home with Zyrtec, Tessalon Perles and Flonase nasal spray.  Educated on smoking cessation.  Patient verbalized understanding.  Follow-up with PCP in 1-2 days.  Return ED for worsening of symptoms.                      Clinical Impression:   The encounter diagnosis was Upper respiratory tract infection, unspecified type.                             TRINY Ceballos  08/11/18 7216

## 2018-08-11 NOTE — ED NOTES
Neuro: AAOx3  Resp: Airway patent, respirations even/unlabored  Cardiac: Skin pink/warm/dry, pulses intact  Abdomen: Soft, non-tender to palpation, denies N/V/D  Musculoskeletal: Moves all extremities equally, ROM intact  Sinusitits, headache

## 2018-08-24 ENCOUNTER — HOSPITAL ENCOUNTER (EMERGENCY)
Facility: HOSPITAL | Age: 33
Discharge: HOME | End: 2018-08-24
Attending: INTERNAL MEDICINE
Payer: MEDICAID

## 2018-08-24 VITALS
HEIGHT: 64 IN | SYSTOLIC BLOOD PRESSURE: 151 MMHG | HEART RATE: 98 BPM | TEMPERATURE: 99 F | BODY MASS INDEX: 35 KG/M2 | RESPIRATION RATE: 18 BRPM | OXYGEN SATURATION: 100 % | DIASTOLIC BLOOD PRESSURE: 79 MMHG | WEIGHT: 205 LBS

## 2018-08-24 DIAGNOSIS — B37.0 ORAL THRUSH: ICD-10-CM

## 2018-08-24 DIAGNOSIS — B37.0 ORAL CANDIDIASIS: Primary | ICD-10-CM

## 2018-08-24 LAB
B-HCG UR QL: NEGATIVE
CTP QC/QA: YES

## 2018-08-24 PROCEDURE — 99283 EMERGENCY DEPT VISIT LOW MDM: CPT

## 2018-08-24 PROCEDURE — 81025 URINE PREGNANCY TEST: CPT | Performed by: INTERNAL MEDICINE

## 2018-08-24 RX ORDER — NYSTATIN 100000 [USP'U]/ML
500000 SUSPENSION ORAL 4 TIMES DAILY
Qty: 200 ML | Refills: 0 | Status: SHIPPED | OUTPATIENT
Start: 2018-08-24 | End: 2018-09-03

## 2018-08-25 NOTE — ED PROVIDER NOTES
Encounter Date: 8/24/2018       History     Chief Complaint   Patient presents with    Lip pain     pt presents with c/o pain to lower lip; pt reports dryness and cracked skin to lip; reports use of blistex did not help to control pain     33-year-old female presents to the emergency department complaining of white lesions to the  lower lip x2 days.  She states she has been biting her lip and GOOGLED her symptoms and is afraid she has AIDS.       The history is provided by the patient. No  was used.     Review of patient's allergies indicates:  No Known Allergies  No past medical history on file.  Past Surgical History:   Procedure Laterality Date    HERNIA REPAIR       Family History   Problem Relation Age of Onset    Diabetes Father     Hypertension Sister      Social History     Tobacco Use    Smoking status: Heavy Tobacco Smoker     Packs/day: 1.50     Types: Cigarettes    Smokeless tobacco: Never Used   Substance Use Topics    Alcohol use: Yes     Comment: occasionally    Drug use: No     Review of Systems   Constitutional: Negative for activity change, appetite change, chills, diaphoresis, fatigue, fever and unexpected weight change.   HENT: Negative for congestion, dental problem, facial swelling, postnasal drip, sore throat, trouble swallowing and voice change.         Lip lesions   Gastrointestinal: Negative for nausea and vomiting.   All other systems reviewed and are negative.      Physical Exam     Initial Vitals [08/24/18 2028]   BP Pulse Resp Temp SpO2   (!) 151/79 98 18 99 °F (37.2 °C) 100 %      MAP       --         Physical Exam    Nursing note and vitals reviewed.  Constitutional: She appears well-developed and well-nourished. No distress.   HENT:   Head: Normocephalic and atraumatic.   Right Ear: External ear normal.   Left Ear: External ear normal.   Three small patchy white lesions to the inner lower lip.  Lesions do not scrape off and are nontender to palpation    Eyes: Conjunctivae and EOM are normal.   Neck: Normal range of motion.   Cardiovascular: Normal rate and regular rhythm.   Pulmonary/Chest: Breath sounds normal. No respiratory distress.   Abdominal: Soft. Bowel sounds are normal.   Musculoskeletal: Normal range of motion.   Neurological: She is alert.   Skin: Skin is dry.   Psychiatric: She has a normal mood and affect. Thought content normal.         ED Course   Procedures  Labs Reviewed   POCT URINE PREGNANCY          Imaging Results    None          Medical Decision Making:   Initial Assessment:   33-year-old female presents to the emergency department complaining of white lesions to the  lower lip x2 days.  She states she has been biting her lip and GOOGLED her symptoms and is afraid she has AIDS.     ED Management:  Patient is given instructions for thrush and a prescription for nystatin.  She was advised to follow up with her primary care physician within the next 3 days for re-evaluation and to return to the emergency department if condition worsens.                      Clinical Impression:   The primary encounter diagnosis was Oral candidiasis. A diagnosis of Oral thrush was also pertinent to this visit.      Disposition:   Disposition: Discharged  Condition: Stable                        Preet Herrera MD  08/24/18 2100

## 2018-08-26 ENCOUNTER — HOSPITAL ENCOUNTER (EMERGENCY)
Facility: HOSPITAL | Age: 33
Discharge: HOME | End: 2018-08-26
Attending: EMERGENCY MEDICINE
Payer: MEDICAID

## 2018-08-26 VITALS
DIASTOLIC BLOOD PRESSURE: 65 MMHG | HEIGHT: 64 IN | HEART RATE: 83 BPM | OXYGEN SATURATION: 98 % | TEMPERATURE: 99 F | SYSTOLIC BLOOD PRESSURE: 117 MMHG | RESPIRATION RATE: 20 BRPM | WEIGHT: 205 LBS | BODY MASS INDEX: 35 KG/M2

## 2018-08-26 DIAGNOSIS — R51.9 RIGHT TEMPORAL HEADACHE: Primary | ICD-10-CM

## 2018-08-26 DIAGNOSIS — R42 DIZZY: ICD-10-CM

## 2018-08-26 DIAGNOSIS — R42 ORTHOSTATIC DIZZINESS: ICD-10-CM

## 2018-08-26 LAB
B-HCG UR QL: NEGATIVE
CTP QC/QA: YES
POCT GLUCOSE: 66 MG/DL (ref 70–110)

## 2018-08-26 PROCEDURE — 25000003 PHARM REV CODE 250: Performed by: PHYSICIAN ASSISTANT

## 2018-08-26 PROCEDURE — 96360 HYDRATION IV INFUSION INIT: CPT

## 2018-08-26 PROCEDURE — 82962 GLUCOSE BLOOD TEST: CPT

## 2018-08-26 PROCEDURE — 81025 URINE PREGNANCY TEST: CPT | Performed by: NURSE PRACTITIONER

## 2018-08-26 PROCEDURE — 93010 ELECTROCARDIOGRAM REPORT: CPT | Mod: ,,, | Performed by: INTERNAL MEDICINE

## 2018-08-26 PROCEDURE — 99284 EMERGENCY DEPT VISIT MOD MDM: CPT | Mod: 25

## 2018-08-26 PROCEDURE — 93005 ELECTROCARDIOGRAM TRACING: CPT

## 2018-08-26 RX ORDER — ACETAMINOPHEN 325 MG/1
650 TABLET ORAL
Status: COMPLETED | OUTPATIENT
Start: 2018-08-26 | End: 2018-08-26

## 2018-08-26 RX ORDER — ACETAMINOPHEN 325 MG/1
650 TABLET ORAL EVERY 6 HOURS PRN
Qty: 12 TABLET | Refills: 0 | Status: SHIPPED | OUTPATIENT
Start: 2018-08-26 | End: 2018-08-29

## 2018-08-26 RX ADMIN — SODIUM CHLORIDE 1000 ML: 0.9 INJECTION, SOLUTION INTRAVENOUS at 03:08

## 2018-08-26 RX ADMIN — ACETAMINOPHEN 650 MG: 325 TABLET, FILM COATED ORAL at 02:08

## 2018-08-26 NOTE — ED TRIAGE NOTES
Pt c/o frontal headache, dizziness x4 days. Also c/o right sided facial pain. Pt states she went to an ED Friday and given mouthwash for thrush and that's when the headache started. Describes headache as pressure, pain is 6/10. Denies N/V. Denies taking meds PTA

## 2018-08-26 NOTE — ED PROVIDER NOTES
"Encounter Date: 8/26/2018  This is a SORT/MSE of a 33 y.o. female presenting to the ED with c/o right sided headache that began upon awakening this morning. Care will be transferred to an alternate provider when patient is roomed for a full evaluation and final disposition. Patient is aware that he/she is awaiting a room in the emergency department, where another provider will review results, evaluate and treat as needed. CHRISTINA Freeman DNP    SCRIBE #1 NOTE: I, Miguel Ragland, am scribing for, and in the presence of,  Matt Resendiz PA-C. I have scribed the following portions of the note - Other sections scribed: HPI/ROS.       History     Chief Complaint   Patient presents with    Dizziness     reports dizziness since she started [facility] for thrush "Yea, I feel dizzy. Like dizzy. Blurry or something"    Headache     CC: Headache     HPI: This 33 y.o. female presents to the ED for an evaluation of acute onset R, temporal headache that began this morning upon wakening. Headache is mild and intermittent in intensity. This present episode feels similar to previous ones. Pt thinks it is related to the Nystatin she is currently taking for thrush. No prior tx at home for headache today. Pt also reports dizziness as if she is going to pass out. Dizziness is worse with movement changes. No hx of HIV, other immunocompromising issues, or cancer. No recent antibiotic or blood thinner use. No recent traumas. Otherwise, pt denies fever, chills, photophobia, seizures, numbness, weakness, chest pain, SOB, cough, or any other associated symptoms.       The history is provided by the patient. No  was used.     Review of patient's allergies indicates:  No Known Allergies  History reviewed. No pertinent past medical history.  Past Surgical History:   Procedure Laterality Date    HERNIA REPAIR       Family History   Problem Relation Age of Onset    Diabetes Father     " Hypertension Sister      Social History     Tobacco Use    Smoking status: Heavy Tobacco Smoker     Packs/day: 1.50     Types: Cigarettes    Smokeless tobacco: Never Used   Substance Use Topics    Alcohol use: Yes     Comment: occasionally    Drug use: No     Review of Systems   Constitutional: Negative for chills and fever.   HENT: Negative for congestion, ear pain, rhinorrhea and sore throat.    Eyes: Negative for photophobia, pain and visual disturbance.   Respiratory: Negative for cough and shortness of breath.    Cardiovascular: Negative for chest pain.   Gastrointestinal: Negative for abdominal pain, diarrhea, nausea and vomiting.   Genitourinary: Negative for dysuria.   Musculoskeletal: Negative for back pain and neck pain.   Skin: Negative for rash.   Neurological: Positive for dizziness and headaches. Negative for seizures, weakness and numbness.   All other systems reviewed and are negative.      Physical Exam     Initial Vitals [08/26/18 1345]   BP Pulse Resp Temp SpO2   138/82 (!) 111 20 98.5 °F (36.9 °C) 100 %      MAP       --         Physical Exam    Nursing note and vitals reviewed.  Constitutional: She appears well-developed and well-nourished. She is not diaphoretic. No distress.   HENT:   Head: Normocephalic and atraumatic.   Right Ear: Tympanic membrane, external ear and ear canal normal. No mastoid tenderness.   Left Ear: Tympanic membrane, external ear and ear canal normal. No mastoid tenderness.   Nose: Nose normal. No rhinorrhea. Right sinus exhibits no maxillary sinus tenderness and no frontal sinus tenderness. Left sinus exhibits no maxillary sinus tenderness and no frontal sinus tenderness.   Mouth/Throat: Uvula is midline and oropharynx is clear and moist. No trismus in the jaw. No dental abscesses or uvula swelling. No oropharyngeal exudate, posterior oropharyngeal edema, posterior oropharyngeal erythema or tonsillar abscesses.   No evidence of thrush or leukoplakia   Eyes:  Conjunctivae and EOM are normal. Right eye exhibits no discharge. Left eye exhibits no discharge.   Neck: Normal range of motion. No tracheal deviation present. No JVD present.   Cardiovascular: Normal rate, regular rhythm and normal heart sounds. Exam reveals no friction rub.    No murmur heard.  Pulmonary/Chest: Breath sounds normal. No stridor. No respiratory distress. She has no wheezes. She has no rhonchi. She has no rales. She exhibits no tenderness.   Musculoskeletal: Normal range of motion.   Lymphadenopathy:     She has no cervical adenopathy.   Neurological: She is alert and oriented to person, place, and time. She has normal strength. She displays no tremor. She displays no seizure activity. Coordination and gait normal.   Skin: Skin is warm and dry. No rash and no abscess noted. No erythema. No pallor.         ED Course   Procedures  Labs Reviewed   POCT GLUCOSE - Abnormal; Notable for the following components:       Result Value    POCT Glucose 66 (*)     All other components within normal limits   POCT URINE PREGNANCY          Imaging Results    None          Medical Decision Making:   History:   Old Medical Records: I decided to obtain old medical records.  Initial Assessment:   34 yo F with R temporal HA and dizziness.   Clinical Tests:   Lab Tests: Ordered and Reviewed  Medical Tests: Reviewed and Ordered  ED Management:  Orthostatic by way of tachycardia, and likely contributing to dizziness. Mild hypoglycemia may also be contributory. No arhythmia on EKG. I doubt intracranial hemorrhage and meningitis.     Pain controlled in ED. Dizziness resolves with fluid. Remains well appearing. Vitals stable. Advising PCP follow up. Strict return precautions discussed. Agreeable to plan.   Other:   I have discussed this case with another health care provider.       <> Summary of the Discussion: Discussed with attending            Scribe Attestation:   Scribe #1: I performed the above scribed service and the  documentation accurately describes the services I performed. I attest to the accuracy of the note.    Attending Attestation:           Physician Attestation for Scribe:  Physician Attestation Statement for Scribe #1: I, Matt Resendiz PA-C, reviewed documentation, as scribed by Miguel Ragland in my presence, and it is both accurate and complete.                    Clinical Impression:   The primary encounter diagnosis was Right temporal headache. Diagnoses of Dizzy and Orthostatic dizziness were also pertinent to this visit.      Disposition:   Disposition: Discharged  Condition: Stable                        Matt Resendiz PA-C  08/26/18 [id number]

## 2019-02-14 ENCOUNTER — HOSPITAL ENCOUNTER (EMERGENCY)
Facility: HOSPITAL | Age: 34
Discharge: HOME | End: 2019-02-15
Attending: EMERGENCY MEDICINE
Payer: MEDICAID

## 2019-02-14 DIAGNOSIS — J06.9 ACUTE URI: Primary | ICD-10-CM

## 2019-02-14 LAB
B-HCG UR QL: NEGATIVE
CTP QC/QA: YES

## 2019-02-14 PROCEDURE — 81025 URINE PREGNANCY TEST: CPT | Mod: ER | Performed by: EMERGENCY MEDICINE

## 2019-02-15 ENCOUNTER — NURSE TRIAGE (OUTPATIENT)
Dept: ADMINISTRATIVE | Facility: CLINIC | Age: 34
End: 2019-02-15

## 2019-02-15 VITALS
SYSTOLIC BLOOD PRESSURE: 129 MMHG | HEART RATE: 95 BPM | RESPIRATION RATE: 18 BRPM | HEIGHT: 65 IN | WEIGHT: 202 LBS | BODY MASS INDEX: 33.66 KG/M2 | TEMPERATURE: 98 F | DIASTOLIC BLOOD PRESSURE: 85 MMHG | OXYGEN SATURATION: 100 %

## 2019-02-15 PROCEDURE — 25000242 PHARM REV CODE 250 ALT 637 W/ HCPCS: Mod: ER | Performed by: EMERGENCY MEDICINE

## 2019-02-15 PROCEDURE — 94760 N-INVAS EAR/PLS OXIMETRY 1: CPT | Mod: ER

## 2019-02-15 PROCEDURE — 94640 AIRWAY INHALATION TREATMENT: CPT | Mod: ER

## 2019-02-15 RX ORDER — HYDROCODONE POLISTIREX AND CHLORPHENIRAMINE POLISTIREX 10; 8 MG/5ML; MG/5ML
5 SUSPENSION, EXTENDED RELEASE ORAL NIGHTLY PRN
Qty: 60 ML | Refills: 0 | Status: SHIPPED | OUTPATIENT
Start: 2019-02-15 | End: 2019-09-06

## 2019-02-15 RX ORDER — MONTELUKAST SODIUM 10 MG/1
10 TABLET ORAL NIGHTLY
Qty: 30 TABLET | Refills: 0 | Status: SHIPPED | OUTPATIENT
Start: 2019-02-15 | End: 2019-03-17

## 2019-02-15 RX ORDER — ALBUTEROL SULFATE 90 UG/1
2 AEROSOL, METERED RESPIRATORY (INHALATION) EVERY 6 HOURS PRN
Qty: 1 INHALER | Refills: 0 | OUTPATIENT
Start: 2019-02-15 | End: 2020-11-04

## 2019-02-15 RX ORDER — BENZONATATE 100 MG/1
100 CAPSULE ORAL 3 TIMES DAILY PRN
Qty: 20 CAPSULE | Refills: 0 | Status: SHIPPED | OUTPATIENT
Start: 2019-02-15 | End: 2019-02-25

## 2019-02-15 RX ORDER — LORATADINE 10 MG/1
10 TABLET ORAL DAILY
Qty: 60 TABLET | Refills: 0 | COMMUNITY
Start: 2019-02-15 | End: 2019-09-06

## 2019-02-15 RX ORDER — PREDNISONE 20 MG/1
40 TABLET ORAL DAILY
Qty: 10 TABLET | Refills: 0 | Status: SHIPPED | OUTPATIENT
Start: 2019-02-15 | End: 2019-02-20

## 2019-02-15 RX ORDER — FLUTICASONE PROPIONATE 50 MCG
2 SPRAY, SUSPENSION (ML) NASAL DAILY
Qty: 16 G | Refills: 0 | OUTPATIENT
Start: 2019-02-15 | End: 2020-11-04

## 2019-02-15 RX ORDER — IPRATROPIUM BROMIDE AND ALBUTEROL SULFATE 2.5; .5 MG/3ML; MG/3ML
3 SOLUTION RESPIRATORY (INHALATION)
Status: COMPLETED | OUTPATIENT
Start: 2019-02-15 | End: 2019-02-15

## 2019-02-15 RX ADMIN — IPRATROPIUM BROMIDE AND ALBUTEROL SULFATE 3 ML: .5; 3 SOLUTION RESPIRATORY (INHALATION) at 01:02

## 2019-02-15 NOTE — ED PROVIDER NOTES
Encounter Date: 2/14/2019    SCRIBE #1 NOTE: I, Cande Mcqueen, am scribing for, and in the presence of, Dr. Joseph.       History     Chief Complaint   Patient presents with    Cough     C/O congested cough x1 week, has tried OTC meds but no relief. States it feels she needs to cough something up but does not come up. Current 2 pack a day smoker. HX asthma.       The patient is a 33 y.o. female Hx of asthma who presents to the ED with complaint of persistent nonproductive cough for 1 week with associated wheezing and rhinorrhea. OTC medications have not provided relief. She does not have a nebulizer at home. 2 ppd smoker. The patient denies fever or any other symptoms at this time. NKDA.       The history is provided by the patient.     Review of patient's allergies indicates:  No Known Allergies  No past medical history on file.  Past Surgical History:   Procedure Laterality Date    HERNIA REPAIR       Family History   Problem Relation Age of Onset    Diabetes Father     Hypertension Sister      Social History     Tobacco Use    Smoking status: Heavy Tobacco Smoker     Packs/day: 1.50     Types: Cigarettes    Smokeless tobacco: Never Used   Substance Use Topics    Alcohol use: Yes     Comment: occasionally    Drug use: No     Review of Systems   Constitutional: Negative for fever.   HENT: Positive for rhinorrhea.    Respiratory: Positive for cough and wheezing.    All other systems reviewed and are negative.      Physical Exam     Initial Vitals [02/14/19 2227]   BP Pulse Resp Temp SpO2   128/70 110 18 97.5 °F (36.4 °C) 99 %      MAP       --         Physical Exam    Nursing note and vitals reviewed.  Constitutional: She appears well-developed and well-nourished.   HENT:   Head: Normocephalic and atraumatic.   Eyes: Conjunctivae are normal.   Neck: Normal range of motion and phonation normal. Neck supple.   Cardiovascular: Normal rate and intact distal pulses.   Pulmonary/Chest: No stridor. No respiratory  distress. She has wheezes.   Inspiratory wheezes bilaterally. Decreased breath sounds.    Abdominal: She exhibits no distension.   Musculoskeletal: Normal range of motion.   Neurological: She is alert and oriented to person, place, and time.   Skin: Skin is warm and dry. Capillary refill takes less than 2 seconds. No rash noted.   Psychiatric: She has a normal mood and affect. Her behavior is normal.         ED Course   Procedures  Labs Reviewed   POCT URINE PREGNANCY          Imaging Results    None          Medical Decision Making:   History:   Old Medical Records: I decided to obtain old medical records.  Clinical Tests:   Lab Tests: Ordered and Reviewed            Scribe Attestation:   Scribe #1: I performed the above scribed service and the documentation accurately describes the services I performed. I attest to the accuracy of the note.      Labs Reviewed  Admission on 02/14/2019, Discharged on 02/15/2019   Component Date Value Ref Range Status    POC Preg Test, Ur 02/14/2019 Negative  Negative Final     Acceptable 02/14/2019 Yes   Final        Imaging Reviewed    Imaging Results    None         Medications given in ED    Medications   albuterol-ipratropium 2.5 mg-0.5 mg/3 mL nebulizer solution 3 mL (3 mLs Nebulization Given 2/15/19 0116)       This document was produced by a scribe under my direction and in my presence. I agree with the content of the note and have made any necessary edits.     Fernando Joseph MD         Note was created using voice recognition software. Note may have occasional typographical errors that may not have been identified and edited despite good [facility]ian initial review prior to signing.         Discharge Medications     Discharge Medication List as of 2/15/2019  1:29 AM      START taking these medications    Details   albuterol (PROVENTIL/VENTOLIN HFA) 90 mcg/actuation inhaler Inhale 2 puffs into the lungs every 6 (six) hours as needed for Wheezing or Shortness of  Breath., Starting Fri 2/15/2019, Normal      benzonatate (TESSALON) 100 MG capsule Take 1 capsule (100 mg total) by mouth 3 (three) times daily as needed for Cough., Starting Fri 2/15/2019, Until Mon 2/25/2019, Normal      fluticasone (FLONASE) 50 mcg/actuation nasal spray 2 sprays (100 mcg total) by Each Nare route once daily., Starting Fri 2/15/2019, Normal      hydrocodone-chlorpheniramine (TUSSIONEX) 10-8 mg/5 mL suspension Take 5 mLs by mouth nightly as needed for Cough or Congestion., Starting Fri 2/15/2019, Print      loratadine (CLARITIN) 10 mg tablet Take 1 tablet (10 mg total) by mouth once daily., Starting Fri 2/15/2019, Until Sat 2/15/2020, OTC      montelukast (SINGULAIR) 10 mg tablet Take 1 tablet (10 mg total) by mouth every evening., Starting Fri 2/15/2019, Until Sun 3/17/2019, Normal      predniSONE (DELTASONE) 20 MG tablet Take 2 tablets (40 mg total) by mouth once daily. for 5 days, Starting Fri 2/15/2019, Until Wed 2/20/2019, Normal         CONTINUE these medications which have NOT CHANGED    Details   ibuprofen (ADVIL,MOTRIN) 600 MG tablet Take 1 tablet (600 mg total) by mouth every 6 (six) hours as needed for Pain (Take with food as needed for mild-to-moderate pain)., Starting Tue 7/10/2018, Print      ketotifen (ZADITOR) 0.025 % (0.035 %) ophthalmic solution Place 1 drop into both eyes 2 (two) times daily., Starting Thu 7/20/2017, Until Fri 7/20/2018, OTC      ondansetron (ZOFRAN) 4 MG tablet Take 1 tablet (4 mg total) by mouth every 8 (eight) hours as needed (Nausea and vomiting)., Starting 3/22/2017, Until Discontinued, Print                   Patient discharged to home in stable condition with instructions to:   1. Please take all meds as prescribed.  2. Follow-up with your primary care doctor   3. Return precautions discussed and patient and/or family/caretaker understands to return to the emergency room for any concerns including worsening of your current symptoms, fever, chills, night  sweats, worsening pain, chest pain, shortness of breath, nausea, vomiting, diarrhea, bleeding, headache, difficulty talking, visual disturbances, weakness, numbness or any other acute concerns       Clinical Impression:       ICD-10-CM ICD-9-CM   1. Acute URI J06.9 465.9                                Fernando Joseph MD  02/22/19 1744

## 2019-09-06 ENCOUNTER — HOSPITAL ENCOUNTER (EMERGENCY)
Facility: HOSPITAL | Age: 34
Discharge: HOME | End: 2019-09-06
Attending: EMERGENCY MEDICINE
Payer: MEDICAID

## 2019-09-06 VITALS
TEMPERATURE: 98 F | SYSTOLIC BLOOD PRESSURE: 128 MMHG | OXYGEN SATURATION: 98 % | WEIGHT: 205 LBS | DIASTOLIC BLOOD PRESSURE: 74 MMHG | BODY MASS INDEX: 34.16 KG/M2 | RESPIRATION RATE: 17 BRPM | HEIGHT: 65 IN | HEART RATE: 89 BPM

## 2019-09-06 DIAGNOSIS — F17.200 TOBACCO DEPENDENCE: ICD-10-CM

## 2019-09-06 DIAGNOSIS — J40 BRONCHITIS: Primary | ICD-10-CM

## 2019-09-06 LAB
B-HCG UR QL: NEGATIVE
CTP QC/QA: YES

## 2019-09-06 PROCEDURE — 94760 N-INVAS EAR/PLS OXIMETRY 1: CPT | Mod: ER

## 2019-09-06 PROCEDURE — 99284 EMERGENCY DEPT VISIT MOD MDM: CPT | Mod: 25,ER

## 2019-09-06 PROCEDURE — 27100098 HC SPACER: Mod: ER

## 2019-09-06 PROCEDURE — 25000242 PHARM REV CODE 250 ALT 637 W/ HCPCS: Mod: ER | Performed by: EMERGENCY MEDICINE

## 2019-09-06 PROCEDURE — 63600175 PHARM REV CODE 636 W HCPCS: Mod: ER | Performed by: EMERGENCY MEDICINE

## 2019-09-06 PROCEDURE — 81025 URINE PREGNANCY TEST: CPT | Mod: ER | Performed by: EMERGENCY MEDICINE

## 2019-09-06 PROCEDURE — 94640 AIRWAY INHALATION TREATMENT: CPT | Mod: ER

## 2019-09-06 RX ORDER — PREDNISONE 20 MG/1
60 TABLET ORAL
Status: COMPLETED | OUTPATIENT
Start: 2019-09-06 | End: 2019-09-06

## 2019-09-06 RX ORDER — ALBUTEROL SULFATE 90 UG/1
1-2 AEROSOL, METERED RESPIRATORY (INHALATION) EVERY 6 HOURS PRN
Qty: 1 INHALER | Refills: 0 | Status: SHIPPED | OUTPATIENT
Start: 2019-09-06 | End: 2020-09-05

## 2019-09-06 RX ORDER — IPRATROPIUM BROMIDE AND ALBUTEROL SULFATE 2.5; .5 MG/3ML; MG/3ML
3 SOLUTION RESPIRATORY (INHALATION)
Status: COMPLETED | OUTPATIENT
Start: 2019-09-06 | End: 2019-09-06

## 2019-09-06 RX ORDER — PREDNISONE 20 MG/1
60 TABLET ORAL DAILY
Qty: 12 TABLET | Refills: 0 | Status: SHIPPED | OUTPATIENT
Start: 2019-09-06 | End: 2019-09-10

## 2019-09-06 RX ORDER — FLUTICASONE PROPIONATE 50 MCG
2 SPRAY, SUSPENSION (ML) NASAL DAILY
Qty: 15 G | Refills: 0 | OUTPATIENT
Start: 2019-09-06 | End: 2020-11-04

## 2019-09-06 RX ADMIN — IPRATROPIUM BROMIDE AND ALBUTEROL SULFATE 3 ML: .5; 3 SOLUTION RESPIRATORY (INHALATION) at 09:09

## 2019-09-06 RX ADMIN — PREDNISONE 60 MG: 20 TABLET ORAL at 09:09

## 2019-09-06 NOTE — DISCHARGE INSTRUCTIONS
Do not smoke.  Rest.  Drink plenty of fluids.  Return here at any time.  Call your doctor for close follow-up

## 2019-09-06 NOTE — ED PROVIDER NOTES
Encounter Date: 9/6/2019    SCRIBE #1 NOTE: I, Hali Mcnally, am scribing for, and in the presence of,  Dr. Gonzalez. I have scribed the following portions of the note - Other sections scribed: HPI, ROS, PE .       History     Chief Complaint   Patient presents with    Asthma     pt states that about 2 days ago started with some wheezing which got worse last night and this morning. states that her asthma pump is not helping. no acute distress noted during triage.     CC: Asthma exacerbation   34 y.o female with asthma presents to the ED with wheezing and intermittent cough since last night. She notes pain in her chest with deep inhales, but no pain as of now. She has been using her asthma pump and inhaler but has had minimal relief. Current smoker (pack and a half a day). She notes her asthma acts up when she smokes. She denies SOB, N/V, abdominal pain, HA, dysuria, congestion, fever, sore throat, or rash. She notes mild left ear pain and states she uses q-tips.            The history is provided by the patient.     Review of patient's allergies indicates:  No Known Allergies  Past Medical History:   Diagnosis Date    Asthma      Past Surgical History:   Procedure Laterality Date    HERNIA REPAIR       Family History   Problem Relation Age of Onset    Diabetes Father     Hypertension Sister      Social History     Tobacco Use    Smoking status: Heavy Tobacco Smoker     Packs/day: 1.50     Types: Cigarettes    Smokeless tobacco: Never Used   Substance Use Topics    Alcohol use: Yes     Comment: occasionally    Drug use: No     Review of Systems   Constitutional: Negative for fever.   HENT: Positive for ear pain (left). Negative for congestion and sore throat.    Respiratory: Positive for wheezing. Negative for shortness of breath.    Cardiovascular: Negative for chest pain.   Gastrointestinal: Negative for abdominal pain, nausea and vomiting.   Genitourinary: Negative for dysuria.   Skin: Negative for rash.    Neurological: Negative for headaches.   All other systems reviewed and are negative.      Physical Exam     Initial Vitals [09/06/19 0821]   BP Pulse Resp Temp SpO2   122/73 105 18 98.8 °F (37.1 °C) 100 %      MAP       --         Physical Exam    Nursing note and vitals reviewed.  Constitutional: She appears well-developed and well-nourished. She is not diaphoretic. No distress.   HENT:   Head: Normocephalic and atraumatic.   Right Ear: Tympanic membrane and external ear normal. Tympanic membrane is not erythematous.   Left Ear: Tympanic membrane and external ear normal. Tympanic membrane is not erythematous.   Bilateral erythema to the canal.    Eyes: EOM are normal.   Neck: Normal range of motion.   Cardiovascular: Normal rate, regular rhythm and normal heart sounds. Exam reveals no gallop and no friction rub.    No murmur heard.  Pulmonary/Chest: No respiratory distress. She has no wheezes. She has no rhonchi. She has no rales.   Decreased breath sounds without wheezing   Abdominal: Soft. There is no tenderness.   Musculoskeletal: Normal range of motion. She exhibits no edema or tenderness.   Neurological: She is alert and oriented to person, place, and time. No cranial nerve deficit or sensory deficit.   Skin: Skin is warm and dry. Capillary refill takes less than 2 seconds.   Psychiatric: She has a normal mood and affect. Her behavior is normal.         ED Course   Procedures  Labs Reviewed   POCT URINE PREGNANCY          Imaging Results    None          Medical Decision Making:   Initial Assessment:   34 y.o female presents to the ED with wheezing since last night. Physical exam findings are significant for bilateral erythematous to canal. Heart and lungs are normal. No wheezing. No respiratory distress. Abdomen benign. Neurovascularly intact. Sensation intact.   Clinical Tests:   Lab Tests: Ordered and Reviewed  ED Management:  I will order a UPT, prednisone, and albuterol.  Patient felt better after the  nebulizer treatment.  She will be referred to a tobacco treatment program she will be discharged on prednisone, Flonase and albuterol    Additional MDM:   Smoking Cessation: The patient is a smoker. The patient was counseled on smoking cessation for: 3 minutes. The patient was counseled on tobacco related  health complications. The patient was referred to a tobacco treatment program.          Scribe Attestation:   Scribe #1: I performed the above scribed service and the documentation accurately describes the services I performed. I attest to the accuracy of the note.           I, Dr. Zakia Gonzalez, personally performed the services described in this documentation. All medical record entries made by the scribe were at my direction and in my presence.  I have reviewed the chart and agree that the record reflects my personal performance and is accurate and complete. Zakia Gonzalez MD.  5:31 PM 09/06/2019          Clinical Impression:     1. Bronchitis    2. Tobacco dependence                                   Zakia Gonzalez MD  09/06/19 1739

## 2019-11-13 ENCOUNTER — HOSPITAL ENCOUNTER (EMERGENCY)
Facility: HOSPITAL | Age: 34
Discharge: HOME | End: 2019-11-13
Attending: EMERGENCY MEDICINE
Payer: MEDICAID

## 2019-11-13 VITALS
SYSTOLIC BLOOD PRESSURE: 118 MMHG | BODY MASS INDEX: 34.83 KG/M2 | HEART RATE: 87 BPM | TEMPERATURE: 98 F | RESPIRATION RATE: 18 BRPM | WEIGHT: 204 LBS | DIASTOLIC BLOOD PRESSURE: 78 MMHG | HEIGHT: 64 IN | OXYGEN SATURATION: 99 %

## 2019-11-13 DIAGNOSIS — L98.9 SKIN LESION: Primary | ICD-10-CM

## 2019-11-13 LAB
B-HCG UR QL: NEGATIVE
CTP QC/QA: YES

## 2019-11-13 PROCEDURE — 99283 EMERGENCY DEPT VISIT LOW MDM: CPT | Mod: ER

## 2019-11-13 PROCEDURE — 81025 URINE PREGNANCY TEST: CPT | Mod: ER | Performed by: EMERGENCY MEDICINE

## 2019-11-13 PROCEDURE — 25000003 PHARM REV CODE 250: Mod: ER | Performed by: NURSE PRACTITIONER

## 2019-11-13 RX ORDER — MUPIROCIN 20 MG/G
OINTMENT TOPICAL
Status: COMPLETED | OUTPATIENT
Start: 2019-11-13 | End: 2019-11-13

## 2019-11-13 RX ORDER — NAPROXEN 500 MG/1
500 TABLET ORAL 2 TIMES DAILY WITH MEALS
Qty: 10 TABLET | Refills: 0 | Status: SHIPPED | OUTPATIENT
Start: 2019-11-13 | End: 2019-11-18

## 2019-11-13 RX ADMIN — MUPIROCIN: 20 OINTMENT TOPICAL at 12:11

## 2019-11-13 NOTE — ED TRIAGE NOTES
Pt with wound to right upper lateral abdomen for unknown length of time. Scab noted to small area on abdomen. Pt reports pain to area upon palpation. No swelling, redness or drainage noted to wound. Afebrile. Pt AAO x4. VSS. No distress

## 2019-11-13 NOTE — ED PROVIDER NOTES
"Encounter Date: 11/13/2019       History     Chief Complaint   Patient presents with    Insect Bite     insect bite/abscess to right flank, noticed this am, no drainage, approx size of a pea, states "whole side is hurting"     33 y/o female presents to the ED with complaints of possible insect bite to right flank area for several days.  Patient denies rash, fever, chest pain, SOB, numbness, weakness, tingling, abdominal pain, back pain, dysuria, hematuria, nausea, vomiting, diarrhea, or any other complaints.  Patient rates her pain as 4/10 and has not tried any medications for her symptoms.            Review of patient's allergies indicates:  No Known Allergies  Past Medical History:   Diagnosis Date    Asthma      Past Surgical History:   Procedure Laterality Date    HERNIA REPAIR       Family History   Problem Relation Age of Onset    Diabetes Father     Hypertension Sister      Social History     Tobacco Use    Smoking status: Heavy Tobacco Smoker     Packs/day: 1.50     Types: Cigarettes    Smokeless tobacco: Never Used   Substance Use Topics    Alcohol use: Yes     Comment: occasionally    Drug use: No     Review of Systems   Constitutional: Negative for chills and fever.   HENT: Negative for congestion, ear pain, rhinorrhea, sore throat and trouble swallowing.    Eyes: Negative for pain, discharge and redness.   Respiratory: Negative for cough and shortness of breath.    Cardiovascular: Negative for chest pain.   Gastrointestinal: Negative for abdominal pain, diarrhea, nausea and vomiting.   Genitourinary: Negative for decreased urine volume and dysuria.   Musculoskeletal: Negative for back pain, neck pain and neck stiffness.   Skin: Positive for rash (lesion to right flank area).   Neurological: Negative for dizziness, weakness, light-headedness, numbness and headaches.   Psychiatric/Behavioral: Negative for confusion.       Physical Exam     Initial Vitals [11/13/19 1017]   BP Pulse Resp Temp SpO2 "   126/81 98 19 97.9 °F (36.6 °C) 100 %      MAP       --         Physical Exam    Nursing note and vitals reviewed.  Constitutional: Vital signs are normal. She appears well-developed.  Non-toxic appearance. She does not appear ill.   HENT:   Head: Normocephalic and atraumatic.   Eyes: Conjunctivae are normal.   Neck: Normal range of motion.   Cardiovascular: Normal rate and regular rhythm.   Pulmonary/Chest: Effort normal and breath sounds normal. She exhibits no tenderness.   Abdominal: Soft. There is no tenderness.   Neurological: She is alert and oriented to person, place, and time. Gait normal. GCS eye subscore is 4. GCS verbal subscore is 5. GCS motor subscore is 6.   Skin: Skin is warm, dry and intact. Lesion noted. No rash noted.   Small lesion with scabbing to right flank area with no purulent drainage, no induration or fluctuance; no erythema; no tenderness to touch   Psychiatric: She has a normal mood and affect. Her speech is normal and behavior is normal. Judgment and thought content normal.         ED Course   Procedures  Labs Reviewed   POCT URINE PREGNANCY          Imaging Results    None                APC / Resident Notes:   This is an evaluation of a 34 y.o. female that presents to the Emergency Department for insect bite    Physical Exam shows a non-toxic, afebrile, and well appearing female. Small lesion with scabbing to right flank area with no purulent drainage, no induration or fluctuance; no erythema; no tenderness to touch    Vital signs are reassuring. If available, previous records reviewed.   RESULTS: UPT negative    My overall impression is skin lesion. I considered, but at this time, do not suspect shingles, tinea, SJS, TEN, abscess, cellulitis.    ED Course: UPT, mupiricin. D/C Meds: Mupiricin. D/C Information: f/u, medictions. The diagnosis, treatment plan, instructions for follow-up and reevaluation with PCP as well as ED return precautions were discussed and understanding was  verbalized. All questions or concerns have been addressed.                               Clinical Impression:       ICD-10-CM ICD-9-CM   1. Skin lesion L98.9 709.9     Disposition:   Disposition: Discharged  Condition: Stable                     TRINY Plunkett  11/13/19 [id number]

## 2019-11-13 NOTE — DISCHARGE INSTRUCTIONS
You have been prescribed Naproxen for pain. This is an Non-Steroidal Anti-Inflammatory (NSAID) Medication. Please do not take any additional NSAIDs while you are taking this medication including (Advil, Aleve, Motrin, Ibuprofen, Mobic\meloxicam, Naprosyn, Toradol, ketoralac, etc.). Please stop taking this medication if you experience: weakness, itching, yellow skin or eyes, joint pains, vomiting blood, blood or black stools, unusual weight gain, or swelling in your arms, legs, hands, or feet.

## 2020-01-01 ENCOUNTER — HOSPITAL ENCOUNTER (EMERGENCY)
Facility: HOSPITAL | Age: 35
Discharge: HOME | End: 2020-01-01
Attending: INTERNAL MEDICINE
Payer: MEDICAID

## 2020-01-01 VITALS
HEART RATE: 88 BPM | HEIGHT: 65 IN | WEIGHT: 207 LBS | TEMPERATURE: 99 F | OXYGEN SATURATION: 100 % | SYSTOLIC BLOOD PRESSURE: 135 MMHG | DIASTOLIC BLOOD PRESSURE: 66 MMHG | BODY MASS INDEX: 34.49 KG/M2 | RESPIRATION RATE: 20 BRPM

## 2020-01-01 DIAGNOSIS — R00.0 TACHYCARDIA: ICD-10-CM

## 2020-01-01 DIAGNOSIS — R05.9 COUGH: Primary | ICD-10-CM

## 2020-01-01 DIAGNOSIS — J02.9 EXUDATIVE PHARYNGITIS: ICD-10-CM

## 2020-01-01 LAB
ALBUMIN SERPL-MCNC: 4 G/DL (ref 3.3–5.5)
ALP SERPL-CCNC: 64 U/L (ref 42–141)
BILIRUB SERPL-MCNC: 0.5 MG/DL (ref 0.2–1.6)
BUN SERPL-MCNC: 12 MG/DL (ref 7–22)
CALCIUM SERPL-MCNC: 9.9 MG/DL (ref 8–10.3)
CHLORIDE SERPL-SCNC: 107 MMOL/L (ref 98–108)
CREAT SERPL-MCNC: 0.9 MG/DL (ref 0.6–1.2)
CTP QC/QA: YES
CTP QC/QA: YES
FLUAV AG NPH QL: NEGATIVE
FLUBV AG NPH QL: NEGATIVE
GLUCOSE SERPL-MCNC: 98 MG/DL (ref 73–118)
POC ALT (SGPT): 18 U/L (ref 10–47)
POC AST (SGOT): 21 U/L (ref 11–38)
POC TCO2: 24 MMOL/L (ref 18–33)
POTASSIUM BLD-SCNC: 3.9 MMOL/L (ref 3.6–5.1)
PROTEIN, POC: 7.5 G/DL (ref 6.4–8.1)
S PYO RRNA THROAT QL PROBE: NEGATIVE
SODIUM BLD-SCNC: 146 MMOL/L (ref 128–145)

## 2020-01-01 PROCEDURE — 87502 INFLUENZA DNA AMP PROBE: CPT | Mod: ER

## 2020-01-01 PROCEDURE — 63600175 PHARM REV CODE 636 W HCPCS: Mod: ER | Performed by: INTERNAL MEDICINE

## 2020-01-01 PROCEDURE — 99284 EMERGENCY DEPT VISIT MOD MDM: CPT | Mod: 25,ER

## 2020-01-01 PROCEDURE — 96361 HYDRATE IV INFUSION ADD-ON: CPT | Mod: ER

## 2020-01-01 PROCEDURE — 87081 CULTURE SCREEN ONLY: CPT

## 2020-01-01 PROCEDURE — 87880 STREP A ASSAY W/OPTIC: CPT | Mod: ER

## 2020-01-01 PROCEDURE — 63600175 PHARM REV CODE 636 W HCPCS: Mod: ER | Performed by: EMERGENCY MEDICINE

## 2020-01-01 PROCEDURE — 85025 COMPLETE CBC W/AUTO DIFF WBC: CPT | Mod: ER

## 2020-01-01 PROCEDURE — 80053 COMPREHEN METABOLIC PANEL: CPT | Mod: ER

## 2020-01-01 PROCEDURE — 96374 THER/PROPH/DIAG INJ IV PUSH: CPT | Mod: ER

## 2020-01-01 RX ORDER — IBUPROFEN 600 MG/1
600 TABLET ORAL EVERY 8 HOURS PRN
Qty: 15 TABLET | Refills: 0 | OUTPATIENT
Start: 2020-01-01 | End: 2020-11-04

## 2020-01-01 RX ORDER — IBUPROFEN 600 MG/1
600 TABLET ORAL EVERY 8 HOURS PRN
Qty: 15 TABLET | Refills: 0 | Status: SHIPPED | OUTPATIENT
Start: 2020-01-01 | End: 2020-01-01 | Stop reason: SDUPTHER

## 2020-01-01 RX ORDER — KETOROLAC TROMETHAMINE 30 MG/ML
15 INJECTION, SOLUTION INTRAMUSCULAR; INTRAVENOUS
Status: COMPLETED | OUTPATIENT
Start: 2020-01-01 | End: 2020-01-01

## 2020-01-01 RX ORDER — SODIUM CHLORIDE 9 MG/ML
1000 INJECTION, SOLUTION INTRAVENOUS ONCE
Status: COMPLETED | OUTPATIENT
Start: 2020-01-01 | End: 2020-01-01

## 2020-01-01 RX ORDER — AMOXICILLIN AND CLAVULANATE POTASSIUM 875; 125 MG/1; MG/1
1 TABLET, FILM COATED ORAL 2 TIMES DAILY
Qty: 20 TABLET | Refills: 0 | Status: SHIPPED | OUTPATIENT
Start: 2020-01-01 | End: 2020-01-11

## 2020-01-01 RX ORDER — AMOXICILLIN AND CLAVULANATE POTASSIUM 875; 125 MG/1; MG/1
1 TABLET, FILM COATED ORAL 2 TIMES DAILY
Qty: 20 TABLET | Refills: 0 | Status: SHIPPED | OUTPATIENT
Start: 2020-01-01 | End: 2020-01-01 | Stop reason: SDUPTHER

## 2020-01-01 RX ADMIN — KETOROLAC TROMETHAMINE 15 MG: 30 INJECTION, SOLUTION INTRAMUSCULAR at 07:01

## 2020-01-01 RX ADMIN — SODIUM CHLORIDE 1000 ML: 0.9 INJECTION, SOLUTION INTRAVENOUS at 06:01

## 2020-01-01 NOTE — ED PROVIDER NOTES
Encounter Date: 1/1/2020       History     Chief Complaint   Patient presents with    Cough     cough with sore throat x 2 days       34-year-old female presents to the emergency department complaining of cough and sore throat x2 days.  She denies fever / chills/ nausea / vomiting / diarrhea, but states she has not been drinking water at all over the past 2 days.    The history is provided by the patient. No  was used.     Review of patient's allergies indicates:  No Known Allergies  Past Medical History:   Diagnosis Date    Asthma      Past Surgical History:   Procedure Laterality Date    HERNIA REPAIR       Family History   Problem Relation Age of Onset    Diabetes Father     Hypertension Sister      Social History     Tobacco Use    Smoking status: Heavy Tobacco Smoker     Packs/day: 1.50     Types: Cigarettes    Smokeless tobacco: Never Used   Substance Use Topics    Alcohol use: Yes     Comment: occasionally    Drug use: No     Review of Systems   Constitutional: Negative for chills and fever.   HENT: Positive for congestion, postnasal drip, rhinorrhea and sore throat.    Respiratory: Positive for cough.    All other systems reviewed and are negative.      Physical Exam     Initial Vitals [01/01/20 0642]   BP Pulse Resp Temp SpO2   135/66 (!) 128 20 98.8 °F (37.1 °C) 100 %      MAP       --         Physical Exam    Nursing note and vitals reviewed.  Constitutional: She appears well-developed and well-nourished. She is not diaphoretic. No distress.   HENT:   Head: Normocephalic and atraumatic.   Right Ear: External ear normal.   Left Ear: External ear normal.   Clear postnasal drip, clear nasal discharge, posterior oropharyngeal erythema without exudate or edema, enlarged nasal turbinates   Eyes: Conjunctivae are normal.   Neck: Normal range of motion. Neck supple.   Cardiovascular: Intact distal pulses.   Tachycardic with regular rhythm   Pulmonary/Chest: Breath sounds normal. No  respiratory distress.   Abdominal: Soft. Bowel sounds are normal.   Musculoskeletal: Normal range of motion.   Neurological: She is alert. She has normal strength.   Skin: Skin is warm and dry.   Psychiatric: She has a normal mood and affect. Thought content normal.         ED Course   Procedures  Labs Reviewed   POCT INFLUENZA A/B   POCT RAPID STREP A   POCT CBC   POCT CMP          Imaging Results    None       X-Rays:   Independently Interpreted Readings:   Chest X-Ray: Normal heart size.  No infiltrates.  No acute abnormalities.     Medical Decision Making:   Initial Assessment:   34-year-old female presents to the emergency department complaining of cough and sore throat x2 days.  She denies fever / chills/ nausea / vomiting / diarrhea, but states she has not been drinking water at all over the past 2 days.  Clinical Tests:   Lab Tests: Ordered and Reviewed  Radiological Study: Ordered and Reviewed  ED Management:    CBC, CMP, rapid strep and rapid flu were ordered as well as normal saline bolus.  Care was transferred to Dr. Morrison at 7:00 a.m.     I assumed care at change of shift patient seen by me at 7:05 a.m..  She has coma and in no acute distress.  Heart rate is 103. Breath sounds are clear.  I see posterior oropharyngeal erythema diffusely with exudates on the left.  No peritonsillar swelling or asymmetry.  Voice is normal.  IV fluids are infusing.  Labs are pending.  Clint Morrison MD, 01/01/2020 7:11 AM    Ms. Flor alvarez feels better.  Heart rate has come down.  Currently 88.  Pain is improved.  I clinically suspect exudative pharyngitis possibly secondary to strep.  Will treat as such with antibiotics.  Culture is pending.  She is stable for discharge. We discussed home care and worrisome signs that should prompt need to return to the ER should they occur.  There is no indication for further emergent intervention or evaluation at this time.                                     Clinical Impression:        ICD-10-CM ICD-9-CM   1. Cough R05 786.2   2. Exudative pharyngitis J02.9 462   3. Tachycardia R00.0 785.0                             Clint Morrison MD  01/01/20 0822

## 2020-01-03 LAB — BACTERIA THROAT CULT: NORMAL

## 2020-11-04 ENCOUNTER — HOSPITAL ENCOUNTER (EMERGENCY)
Facility: HOSPITAL | Age: 35
Discharge: HOME | End: 2020-11-04
Attending: EMERGENCY MEDICINE
Payer: MEDICAID

## 2020-11-04 VITALS
SYSTOLIC BLOOD PRESSURE: 147 MMHG | RESPIRATION RATE: 20 BRPM | BODY MASS INDEX: 34.72 KG/M2 | WEIGHT: 216 LBS | HEIGHT: 66 IN | DIASTOLIC BLOOD PRESSURE: 81 MMHG | OXYGEN SATURATION: 100 % | HEART RATE: 99 BPM | TEMPERATURE: 99 F

## 2020-11-04 DIAGNOSIS — J45.20 MILD INTERMITTENT ASTHMA WITHOUT COMPLICATION: ICD-10-CM

## 2020-11-04 DIAGNOSIS — Z76.0 ENCOUNTER FOR MEDICATION REFILL: Primary | ICD-10-CM

## 2020-11-04 DIAGNOSIS — R09.82 POST-NASAL DRIP: ICD-10-CM

## 2020-11-04 LAB
B-HCG UR QL: NEGATIVE
CTP QC/QA: YES

## 2020-11-04 PROCEDURE — 81025 URINE PREGNANCY TEST: CPT | Mod: ER | Performed by: EMERGENCY MEDICINE

## 2020-11-04 PROCEDURE — 99284 EMERGENCY DEPT VISIT MOD MDM: CPT | Mod: ER

## 2020-11-04 RX ORDER — MONTELUKAST SODIUM 10 MG/1
10 TABLET ORAL NIGHTLY
Qty: 30 TABLET | Refills: 0 | Status: SHIPPED | OUTPATIENT
Start: 2020-11-04 | End: 2020-12-04

## 2020-11-04 RX ORDER — FLUTICASONE PROPIONATE 50 MCG
2 SPRAY, SUSPENSION (ML) NASAL DAILY
Qty: 16 G | Refills: 0 | OUTPATIENT
Start: 2020-11-04 | End: 2022-07-12

## 2020-11-04 RX ORDER — IPRATROPIUM BROMIDE AND ALBUTEROL SULFATE 2.5; .5 MG/3ML; MG/3ML
3 SOLUTION RESPIRATORY (INHALATION) EVERY 6 HOURS PRN
Qty: 50 VIAL | Refills: 0 | Status: SHIPPED | OUTPATIENT
Start: 2020-11-04 | End: 2021-11-04

## 2020-11-04 RX ORDER — ALBUTEROL SULFATE 90 UG/1
2 AEROSOL, METERED RESPIRATORY (INHALATION) EVERY 6 HOURS PRN
Qty: 18 G | Refills: 0 | OUTPATIENT
Start: 2020-11-04 | End: 2022-07-12

## 2020-11-04 NOTE — ED PROVIDER NOTES
Encounter Date: 11/4/2020       History     Chief Complaint   Patient presents with    Medication Refill     Reports needing a refill on her asthma inhaler and solution. Reports that she has been sending messages here from pharmacy and does not have a PCP     35 y.o. female with asthma and tobacco abuse presents to ED requesting refill on albuterol nebs and inhaler. Reports mild post nasal drip intermittently for months. Denies SOB, wheezing, cough, chest tightness or fever.    The history is provided by the patient.     Review of patient's allergies indicates:  No Known Allergies  Past Medical History:   Diagnosis Date    Asthma      Past Surgical History:   Procedure Laterality Date    HERNIA REPAIR       Family History   Problem Relation Age of Onset    Diabetes Father     Hypertension Sister      Social History     Tobacco Use    Smoking status: Heavy Tobacco Smoker     Packs/day: 1.50     Types: Cigarettes    Smokeless tobacco: Never Used   Substance Use Topics    Alcohol use: Yes     Comment: occasionally    Drug use: No     Review of Systems   Constitutional: Negative for fever.   HENT: Positive for postnasal drip.    Respiratory: Negative for cough, chest tightness, shortness of breath and wheezing.    All other systems reviewed and are negative.      Physical Exam     Initial Vitals [11/04/20 0655]   BP Pulse Resp Temp SpO2   (!) 147/81 99 20 98.6 °F (37 °C) 100 %      MAP       --         Physical Exam    Nursing note and vitals reviewed.  Constitutional: She appears well-developed and well-nourished. She is not diaphoretic. No distress.   HENT:   Head: Normocephalic and atraumatic.   Mouth/Throat: Oropharynx is clear and moist.   Eyes: Conjunctivae are normal. Pupils are equal, round, and reactive to light.   Neck: Normal range of motion. Neck supple.   Cardiovascular: Normal rate and intact distal pulses.   Pulmonary/Chest: Effort normal and breath sounds normal. No accessory muscle usage. No  tachypnea. No respiratory distress. She has no decreased breath sounds. She has no wheezes.   Abdominal: She exhibits no distension. There is no abdominal tenderness.   Musculoskeletal: Normal range of motion. No tenderness.   Neurological: She is alert and oriented to person, place, and time. She has normal strength. Gait normal. GCS eye subscore is 4. GCS verbal subscore is 5. GCS motor subscore is 6.   Skin: Skin is warm. Capillary refill takes less than 2 seconds.   Psychiatric: She has a normal mood and affect.         ED Course   Procedures  Labs Reviewed   POCT URINE PREGNANCY          Imaging Results    None                             Labs Reviewed  Admission on 11/04/2020   Component Date Value Ref Range Status    POC Preg Test, Ur 11/04/2020 Negative  Negative Final     Acceptable 11/04/2020 Yes   Final        Imaging Reviewed    Imaging Results    None         Medications given in ED    Medications - No data to display    Note was created using voice recognition software. Note may have occasional typographical errors that may not have been identified and edited despite good christian initial review prior to signing.              Clinical Impression:     ICD-10-CM ICD-9-CM   1. Encounter for medication refill  Z76.0 V68.1   2. Post-nasal drip  R09.82 784.91   3. Mild intermittent asthma without complication  J45.20 493.90                          ED Disposition Condition    Discharge Stable        ED Prescriptions     Medication Sig Dispense Start Date End Date Auth. Provider    albuterol-ipratropium (DUO-NEB) 2.5 mg-0.5 mg/3 mL nebulizer solution Take 3 mLs by nebulization every 6 (six) hours as needed for Wheezing or Shortness of Breath (cough). 50 vial 11/4/2020 11/4/2021 Fernando Joseph MD    albuterol (PROVENTIL/VENTOLIN HFA) 90 mcg/actuation inhaler Inhale 2 puffs into the lungs every 6 (six) hours as needed for Wheezing or Shortness of Breath. 18 g 11/4/2020  Fernando Joseph MD     fluticasone propionate (FLONASE) 50 mcg/actuation nasal spray 2 sprays (100 mcg total) by Each Nostril route once daily. 16 g 11/4/2020  Fernando Joseph MD    montelukast (SINGULAIR) 10 mg tablet Take 1 tablet (10 mg total) by mouth every evening. 30 tablet 11/4/2020 12/4/2020 Fernando Joseph MD        Follow-up Information     Follow up With Specialties Details Why Contact Info    Peri Martinez MD General Practice Call today to schedule an appointment, for re-evaluation of today's complaint, and ongoing care [address] [facility]  [address]  [place] [address]  [phone]                                         Fernando [place] MD  11/04/20 0749

## 2022-07-12 ENCOUNTER — HOSPITAL ENCOUNTER (EMERGENCY)
Facility: HOSPITAL | Age: 37
Discharge: HOME | End: 2022-07-12
Attending: EMERGENCY MEDICINE
Payer: MEDICAID

## 2022-07-12 VITALS
SYSTOLIC BLOOD PRESSURE: 139 MMHG | TEMPERATURE: 99 F | OXYGEN SATURATION: 97 % | RESPIRATION RATE: 18 BRPM | DIASTOLIC BLOOD PRESSURE: 88 MMHG | HEART RATE: 84 BPM | WEIGHT: 220 LBS | BODY MASS INDEX: 35.51 KG/M2

## 2022-07-12 DIAGNOSIS — J06.9 VIRAL URI WITH COUGH: Primary | ICD-10-CM

## 2022-07-12 LAB
B-HCG UR QL: NEGATIVE
CTP QC/QA: YES
CTP QC/QA: YES
INFLUENZA A ANTIGEN, POC: NEGATIVE
INFLUENZA B ANTIGEN, POC: NEGATIVE
SARS-COV-2 RDRP RESP QL NAA+PROBE: NEGATIVE

## 2022-07-12 PROCEDURE — 99284 EMERGENCY DEPT VISIT MOD MDM: CPT | Mod: 25,ER

## 2022-07-12 PROCEDURE — 25000242 PHARM REV CODE 250 ALT 637 W/ HCPCS: Mod: ER | Performed by: PHYSICIAN ASSISTANT

## 2022-07-12 PROCEDURE — 81025 URINE PREGNANCY TEST: CPT | Mod: ER | Performed by: EMERGENCY MEDICINE

## 2022-07-12 PROCEDURE — U0002 COVID-19 LAB TEST NON-CDC: HCPCS | Mod: ER | Performed by: NURSE PRACTITIONER

## 2022-07-12 PROCEDURE — 87804 INFLUENZA ASSAY W/OPTIC: CPT | Mod: 59,ER

## 2022-07-12 PROCEDURE — 25000003 PHARM REV CODE 250: Mod: ER | Performed by: PHYSICIAN ASSISTANT

## 2022-07-12 PROCEDURE — 94640 AIRWAY INHALATION TREATMENT: CPT | Mod: ER

## 2022-07-12 RX ORDER — ACETAMINOPHEN 500 MG
500 TABLET ORAL EVERY 4 HOURS PRN
Qty: 20 TABLET | Refills: 0 | Status: SHIPPED | OUTPATIENT
Start: 2022-07-12 | End: 2022-07-17

## 2022-07-12 RX ORDER — IBUPROFEN 600 MG/1
600 TABLET ORAL EVERY 6 HOURS PRN
Qty: 20 TABLET | Refills: 0 | Status: SHIPPED | OUTPATIENT
Start: 2022-07-12 | End: 2022-07-17

## 2022-07-12 RX ORDER — ALBUTEROL SULFATE 90 UG/1
1-2 AEROSOL, METERED RESPIRATORY (INHALATION) EVERY 6 HOURS PRN
Qty: 18 G | Refills: 0 | Status: SHIPPED | OUTPATIENT
Start: 2022-07-12 | End: 2022-08-11

## 2022-07-12 RX ORDER — BENZONATATE 100 MG/1
100 CAPSULE ORAL 3 TIMES DAILY PRN
Qty: 20 CAPSULE | Refills: 0 | Status: SHIPPED | OUTPATIENT
Start: 2022-07-12 | End: 2022-07-19

## 2022-07-12 RX ORDER — BENZONATATE 100 MG/1
100 CAPSULE ORAL
Status: COMPLETED | OUTPATIENT
Start: 2022-07-12 | End: 2022-07-12

## 2022-07-12 RX ORDER — CETIRIZINE HYDROCHLORIDE 10 MG/1
10 TABLET ORAL
Status: COMPLETED | OUTPATIENT
Start: 2022-07-12 | End: 2022-07-12

## 2022-07-12 RX ORDER — FLUTICASONE PROPIONATE 50 MCG
1 SPRAY, SUSPENSION (ML) NASAL 2 TIMES DAILY PRN
Qty: 15 G | Refills: 0 | Status: SHIPPED | OUTPATIENT
Start: 2022-07-12 | End: 2022-08-11

## 2022-07-12 RX ORDER — IPRATROPIUM BROMIDE AND ALBUTEROL SULFATE 2.5; .5 MG/3ML; MG/3ML
3 SOLUTION RESPIRATORY (INHALATION)
Status: COMPLETED | OUTPATIENT
Start: 2022-07-12 | End: 2022-07-12

## 2022-07-12 RX ORDER — CETIRIZINE HYDROCHLORIDE 10 MG/1
10 TABLET ORAL DAILY
Qty: 14 TABLET | Refills: 0 | Status: SHIPPED | OUTPATIENT
Start: 2022-07-12 | End: 2022-07-26

## 2022-07-12 RX ADMIN — CETIRIZINE HYDROCHLORIDE 10 MG: 10 TABLET, FILM COATED ORAL at 09:07

## 2022-07-12 RX ADMIN — BENZONATATE 100 MG: 100 CAPSULE ORAL at 09:07

## 2022-07-12 RX ADMIN — IPRATROPIUM BROMIDE AND ALBUTEROL SULFATE 3 ML: .5; 3 SOLUTION RESPIRATORY (INHALATION) at 09:07

## 2022-07-12 NOTE — FIRST PROVIDER EVALUATION
" Emergency Department TeleTriage Encounter Note      CHIEF COMPLAINT    Chief Complaint   Patient presents with    URI     Pt states," I have a cough for two days."       VITAL SIGNS   Initial Vitals [07/12/22 1728]   BP Pulse Resp Temp SpO2   138/87 100 16 98.7 °F (37.1 °C) 97 %      MAP       --            ALLERGIES    Review of patient's allergies indicates:  No Known Allergies    PROVIDER TRIAGE NOTE  This is a teletriage evaluation of a 37 y.o. female presenting to the ED complaining of cough for two days. No relief from OTC medications.  Reports subjective fever yesterday.     Initial orders will be placed and care will be transferred to an alternate provider when patient is roomed for a full evaluation. Any additional orders and the final disposition will be determined by that provider.           ORDERS  Labs Reviewed   POCT URINE PREGNANCY   SARS-COV-2 RDRP GENE   POCT INFLUENZA A/B MOLECULAR       ED Orders (720h ago, onward)    Start Ordered     Status Ordering Provider    07/12/22 1801 07/12/22 1800  POCT COVID-19 Rapid Screening  Once         Ordered MAUREEN KULKARNI N.    07/12/22 1801 07/12/22 1800  Airborne and Contact and Droplet Isolation Status  Continuous         Ordered MAUREEN KULKARNI N.    07/12/22 1801 07/12/22 1800  POCT Influenza A/B Molecular  Once         Ordered MAUREEN KULKARNI N.    07/12/22 1731 07/12/22 1730  POCT urine pregnancy  Once         Final result HAO MILLER            Virtual Visit Note: The provider triage portion of this emergency department evaluation and documentation was performed via [place], a HIPAA-compliant telemedicine application, in concert with a tele-presenter in the room. A face to face patient evaluation with one of my colleagues will occur once the patient is placed in an emergency department room.      DISCLAIMER: This note was prepared with [employer] voice recognition transcription software. Garbled syntax, mangled pronouns, " and other bizarre constructions may be attributed to that software system.

## 2022-07-12 NOTE — Clinical Note
"Kina"Rubens Falcon was seen and treated in our emergency department on 7/12/2022.     COVID-19 is present in our communities across the state. There is limited testing for COVID at this time, so not all patients can be tested. In this situation, your employee meets the following criteria:    Kina Falcon has met the criteria for COVID-19 testing and has a NEGATIVE result. The employee can return to work once they are asymptomatic for 24 hours without the use of fever reducing medications (Tylenol, Motrin, etc).     If the employee is not fully vaccinated and had a close contact:  · Retest at 5 to 7 days post-exposure  · If possible, it is recommended that they quarantine for 5 days from the time of contact regardless of their test status.  · A mask should be worn post quarantine for 5 days.    If you have any questions or concerns, or if I can be of further assistance, please do not hesitate to contact me.    Sincerely,             Kathy Eduardo PA-C"

## 2022-07-13 NOTE — DISCHARGE INSTRUCTIONS

## 2023-01-07 ENCOUNTER — HOSPITAL ENCOUNTER (EMERGENCY)
Facility: HOSPITAL | Age: 38
Discharge: HOME | End: 2023-01-07
Attending: EMERGENCY MEDICINE
Payer: MEDICAID

## 2023-01-07 VITALS
HEIGHT: 66 IN | DIASTOLIC BLOOD PRESSURE: 71 MMHG | BODY MASS INDEX: 38.25 KG/M2 | RESPIRATION RATE: 20 BRPM | TEMPERATURE: 98 F | WEIGHT: 238 LBS | SYSTOLIC BLOOD PRESSURE: 125 MMHG | HEART RATE: 99 BPM | OXYGEN SATURATION: 98 %

## 2023-01-07 DIAGNOSIS — R51.9 FACIAL PAIN: Primary | ICD-10-CM

## 2023-01-07 DIAGNOSIS — H92.02 EAR PAIN, LEFT: ICD-10-CM

## 2023-01-07 LAB
B-HCG UR QL: NEGATIVE
CTP QC/QA: YES

## 2023-01-07 PROCEDURE — 99284 EMERGENCY DEPT VISIT MOD MDM: CPT | Mod: ER

## 2023-01-07 PROCEDURE — 81025 URINE PREGNANCY TEST: CPT | Mod: ER | Performed by: EMERGENCY MEDICINE

## 2023-01-07 RX ORDER — PREDNISONE 20 MG/1
40 TABLET ORAL DAILY
Qty: 10 TABLET | Refills: 0 | Status: SHIPPED | OUTPATIENT
Start: 2023-01-07 | End: 2023-01-12

## 2023-01-07 RX ORDER — NAPROXEN 500 MG/1
500 TABLET ORAL 2 TIMES DAILY
Qty: 20 TABLET | Refills: 0 | Status: SHIPPED | OUTPATIENT
Start: 2023-01-07

## 2023-01-07 NOTE — ED PROVIDER NOTES
Encounter Date: 1/7/2023    SCRIBE #1 NOTE: IRenea, am scribing for, and in the presence of,  Rocco Goel PA-C. I have scribed the following portions of the note - Other sections scribed: HPI, ROS, PE.     History     Chief Complaint   Patient presents with    Facial Pain     PT COMPLAINS OF LEFT-SIDED FACIAL PAIN FOR ABOUT 3 DAYS WITH MILD SWELLING. DESCRIBES PAIN AS SHARP AND INTERMITTENT. DENIES TOOTH PAIN. DENIES N/V/DIARRHEA. PT HAS BEEN TAKING IBUPROFEN. DENIES ANY OTHER SYMPTOMS AT THIS TIME.      Kina Falcon is a 37 y.o. female, with a PMHx of Asthma, who presents to the ED with left-sided facial/jaw pain for 3 days with mild swelling. Patient reports also having intermittent headache and otalgia of the right ear for 3 days.  Denies any headache at this time.  She notes she has a history with sinuses and allergies. The patient also notes taking Ibuprofen with no relief. No other exacerbating or alleviating factors. Denies fever, chills, shortness of breath, abdominal pain, back pain, rhinorrhea, nausea, vomiting, diarrhea, congestion, cough, sore throat, trouble swallowing, dental pain, dysuria, or trauma. LMP 01/03/2023    The history is provided by the patient.   Review of patient's allergies indicates:  No Known Allergies  Past Medical History:   Diagnosis Date    Asthma      Past Surgical History:   Procedure Laterality Date    HERNIA REPAIR       Family History   Problem Relation Age of Onset    Diabetes Father     Hypertension Sister      Social History     Tobacco Use    Smoking status: Heavy Smoker     Packs/day: 1.50     Types: Cigarettes    Smokeless tobacco: Never   Substance Use Topics    Alcohol use: Yes     Comment: occasionally    Drug use: No     Review of Systems   Constitutional:  Negative for chills and fever.   HENT:  Positive for ear pain and facial swelling. Negative for congestion, dental problem, rhinorrhea, sore throat and trouble swallowing.    Respiratory:   Negative for cough and shortness of breath.    Cardiovascular:  Negative for chest pain.   Gastrointestinal:  Negative for abdominal pain, diarrhea, nausea and vomiting.   Genitourinary:  Negative for dysuria.   Musculoskeletal:  Negative for back pain.   Neurological:  Positive for headaches.     Physical Exam     Initial Vitals [01/07/23 1340]   BP Pulse Resp Temp SpO2   137/76 (!) 113 20 98.4 °F (36.9 °C) 97 %      MAP       --         Physical Exam    Nursing note and vitals reviewed.  Constitutional: She appears well-developed and well-nourished.  Non-toxic appearance. She does not appear ill.   HENT:   Head: Normocephalic and atraumatic.   Right Ear: Hearing, tympanic membrane, external ear and ear canal normal. No mastoid tenderness. Tympanic membrane is not perforated, not erythematous and not bulging.   Left Ear: Hearing, tympanic membrane, external ear and ear canal normal. No mastoid tenderness. Tympanic membrane is not perforated, not erythematous and not bulging.   Nose: Nose normal.   Mouth/Throat: Uvula is midline, oropharynx is clear and moist and mucous membranes are normal. Dental caries (Multiple) present. No dental abscesses or uvula swelling.   No ear proptosis bilaterally.  No mastoid tenderness or erythema bilaterally.  No pain with tragus or pinna manipulation.  No tenderness to maxillary or frontal sinuses bilaterally.  No obvious swelling to left-sided face.  No erythema or cellulitis to patient's face, jaw, ear, or mastoid areas.  Mild tenderness to left jaw.  Full range of motion of jaw.   Eyes: Conjunctivae and EOM are normal.   Neck: Neck supple.   Normal range of motion.   Full passive range of motion without pain.     Cardiovascular:  Normal rate and regular rhythm.           Pulses:       Radial pulses are 2+ on the right side and 2+ on the left side.   Pulmonary/Chest: Effort normal and breath sounds normal. No respiratory distress.   Abdominal: Abdomen is soft. Bowel sounds are  normal. She exhibits no distension. There is no abdominal tenderness. There is no rebound and no guarding.   Musculoskeletal:         General: Normal range of motion.      Cervical back: Full passive range of motion without pain, normal range of motion and neck supple. No rigidity.     Neurological: She is alert. No cranial nerve deficit.   Neuro intact.  Strength and sensation intact bilateral upper and lower extremities.   Skin: Skin is warm and dry.   Psychiatric: She has a normal mood and affect.       ED Course   Procedures  Labs Reviewed   POCT URINE PREGNANCY          Imaging Results    None          Medications - No data to display  Medical Decision Making:   History:   Old Medical Records: I decided to obtain old medical records.  Clinical Tests:   Lab Tests: Ordered and Reviewed  ED Management:  This is a 37 y.o. female, with a PMHx of Asthma, who presents to the ED with left-sided facial/jaw pain for 3 days with mild swelling. Patient reports also having intermittent headache and otalgia of the right ear for 3 days. On physical exam, patient is well-appearing and in no acute distress.  Nontoxic appearing.  Lungs are clear to auscultation bilaterally.  Abdomen is soft and nontender.  No guarding, rigidity, rebound.  2+ radial pulses bilaterally.  Posterior oropharynx is not erythematous.  No edema or exudate.  Uvula midline.  Bilateral tympanic membrane is normal.  No erythema, bulging, or perforations.  Neuro intact.  Strength and sensation intact bilateral upper and lower extremities.  Full range of motion of neck.  No neck rigidity.  No ear proptosis bilaterally.  No mastoid tenderness or erythema bilaterally.  No pain with tragus or pinna manipulation.  No tenderness to maxillary or frontal sinuses bilaterally.  No obvious swelling to left-sided face.  No erythema or cellulitis to patient's face, jaw, ear, or mastoid areas.  Mild tenderness to left jaw.  Full range of motion of jaw.  Multiple dental  caries throughout patient's mouth.  No evidence of any dental abscesses.  UPT negative.  Will discharge patient on naproxen and short course of prednisone.  Ambulatory referral to ENT ordered.  Urged prompt follow-up with ENT and PCP for further evaluation.    Strict return precautions given. I discussed with the patient/family the diagnosis, treatment plan, indications for return to the emergency department, and for expected follow-up. The patient/family verbalized an understanding. The patient/family is asked if there are any questions or concerns. We discuss the case, until all issues are addressed to the patient/family's satisfaction. Patient/family understands and is agreeable to the plan. Patient is stable and ready for discharge.          Scribe Attestation:   Scribe #1: I performed the above scribed service and the documentation accurately describes the services I performed. I attest to the accuracy of the note.            I, Rocco Goel, personally performed the services described in this documentation. All medical record entries made by the scribe were at my direction and in my presence. I have reviewed the chart and agree that the record reflects my personal performance and is accurate and complete.        Clinical Impression:   Final diagnoses:  [R51.9] Facial pain (Primary)  [H92.02] Ear pain, left        ED Disposition Condition    Discharge Stable          ED Prescriptions       Medication Sig Dispense Start Date End Date Auth. Provider    naproxen (NAPROSYN) 500 MG tablet Take 1 tablet (500 mg total) by mouth 2 (two) times daily. 20 tablet 1/7/2023 -- Rocco Goel PA-C    predniSONE (DELTASONE) 20 MG tablet Take 2 tablets (40 mg total) by mouth once daily. for 5 days 10 tablet 1/7/2023 1/12/2023 Rocco Goel PA-C          Follow-up Information       Follow up With Specialties Details Why Contact Info    Peri Martinez MD General Practice In 2 days for further evaluation [address] [facility]  [address] [place] [address]  [place] [address]  [phone]      [facility] ED Emergency Medicine In 2 days If symptoms worsen [address] [facility] [address] [phone]             Rocco Goel PA-C  01/07/23 1610       Rocco Goel PA-C  01/07/23 1612

## 2023-01-07 NOTE — ED PROVIDER NOTES
"Encounter Date: 1/7/2023       History     Chief Complaint   Patient presents with    Facial Pain     PT COMPLAINS OF LEFT-SIDED FACIAL PAIN FOR ABOUT 3 DAYS WITH MILD SWELLING. DESCRIBES PAIN AS SHARP AND INTERMITTENT. DENIES TOOTH PAIN. DENIES N/V/DIARRHEA. PT HAS BEEN TAKING IBUPROFEN. DENIES ANY OTHER SYMPTOMS AT THIS TIME.      HPI  Review of patient's allergies indicates:  No Known Allergies  Past Medical History:   Diagnosis Date    Asthma      Past Surgical History:   Procedure Laterality Date    HERNIA REPAIR       Family History   Problem Relation Age of Onset    Diabetes Father     Hypertension Sister      Social History     Tobacco Use    Smoking status: Heavy Smoker     Packs/day: 1.50     Types: Cigarettes    Smokeless tobacco: Never   Substance Use Topics    Alcohol use: Yes     Comment: occasionally    Drug use: No     Review of Systems    Physical Exam     Initial Vitals [01/07/23 1340]   BP Pulse Resp Temp SpO2   137/76 (!) 113 20 98.4 °F (36.9 °C) 97 %      MAP       --         Physical Exam    ED Course   Procedures  Labs Reviewed   POCT URINE PREGNANCY          Imaging Results    None          Medications - No data to display                           Clinical Impression:    ***Please document a Clinical Impression and click the "Refresh" button to refresh your note and automatically pull in before signing.***         "

## 2023-01-07 NOTE — DISCHARGE INSTRUCTIONS

## 2024-03-14 ENCOUNTER — HOSPITAL ENCOUNTER (EMERGENCY)
Facility: HOSPITAL | Age: 39
Discharge: HOME | End: 2024-03-14
Attending: EMERGENCY MEDICINE

## 2024-03-14 VITALS
WEIGHT: 226 LBS | HEART RATE: 101 BPM | OXYGEN SATURATION: 100 % | HEIGHT: 66 IN | SYSTOLIC BLOOD PRESSURE: 117 MMHG | DIASTOLIC BLOOD PRESSURE: 79 MMHG | BODY MASS INDEX: 36.32 KG/M2 | RESPIRATION RATE: 16 BRPM | TEMPERATURE: 98 F

## 2024-03-14 DIAGNOSIS — N92.6 IRREGULAR PERIODS: ICD-10-CM

## 2024-03-14 DIAGNOSIS — N93.9 VAGINAL SPOTTING: ICD-10-CM

## 2024-03-14 DIAGNOSIS — R10.30 LOWER ABDOMINAL PAIN: ICD-10-CM

## 2024-03-14 DIAGNOSIS — N93.8 DUB (DYSFUNCTIONAL UTERINE BLEEDING): Primary | ICD-10-CM

## 2024-03-14 LAB
B-HCG UR QL: NEGATIVE
BILIRUBIN, POC UA: NEGATIVE
BILIRUBIN, UA POC OHS: NEGATIVE
BLOOD, POC UA: ABNORMAL
BLOOD, UA POC OHS: ABNORMAL
CLARITY, POC UA: CLEAR
CLARITY, UA POC OHS: CLEAR
COLOR, POC UA: YELLOW
COLOR, UA POC OHS: YELLOW
CTP QC/QA: YES
GLUCOSE, POC UA: NEGATIVE
GLUCOSE, UA POC OHS: NEGATIVE
KETONES, POC UA: NEGATIVE
KETONES, UA POC OHS: NEGATIVE
LEUKOCYTE EST, POC UA: NEGATIVE
LEUKOCYTES, UA POC OHS: NEGATIVE
NITRITE, POC UA: NEGATIVE
NITRITE, UA POC OHS: NEGATIVE
PH UR STRIP: 5.5 [PH]
PH, UA POC OHS: 5.5
PROTEIN, POC UA: NEGATIVE
PROTEIN, UA POC OHS: NEGATIVE
SPECIFIC GRAVITY, POC UA: 1.01
SPECIFIC GRAVITY, UA POC OHS: 1.01
UROBILINOGEN, POC UA: 0.2 E.U./DL
UROBILINOGEN, UA POC OHS: 0.2

## 2024-03-14 PROCEDURE — 87491 CHLMYD TRACH DNA AMP PROBE: CPT

## 2024-03-14 PROCEDURE — 25000003 PHARM REV CODE 250: Mod: ER

## 2024-03-14 PROCEDURE — 81025 URINE PREGNANCY TEST: CPT | Mod: ER

## 2024-03-14 PROCEDURE — 81025 URINE PREGNANCY TEST: CPT | Mod: ER | Performed by: EMERGENCY MEDICINE

## 2024-03-14 PROCEDURE — 99283 EMERGENCY DEPT VISIT LOW MDM: CPT | Mod: 25,ER

## 2024-03-14 PROCEDURE — 81514 NFCT DS BV&VAGINITIS DNA ALG: CPT

## 2024-03-14 RX ORDER — NAPROXEN 250 MG/1
500 TABLET ORAL
Status: COMPLETED | OUTPATIENT
Start: 2024-03-14 | End: 2024-03-14

## 2024-03-14 RX ORDER — NAPROXEN 500 MG/1
500 TABLET ORAL 2 TIMES DAILY PRN
Qty: 30 TABLET | Refills: 0 | Status: SHIPPED | OUTPATIENT
Start: 2024-03-14

## 2024-03-14 RX ADMIN — NAPROXEN 500 MG: 250 TABLET ORAL at 08:03

## 2024-03-14 NOTE — Clinical Note
"Kina Falcon (Michelle) was seen and treated in our emergency department on 3/14/2024.  She may return to work on 03/16/2024.       If you have any questions or concerns, please don't hesitate to call.      Milagros Leiva RN    "

## 2024-03-14 NOTE — DISCHARGE INSTRUCTIONS

## 2024-03-14 NOTE — Clinical Note
"Kina Falcon (Michelle) was seen and treated in our emergency department on 3/14/2024.  She may return to work on 03/18/2024.       If you have any questions or concerns, please don't hesitate to call.       RN    "

## 2024-03-14 NOTE — ED PROVIDER NOTES
"Encounter Date: 3/14/2024    SCRIBE #1 NOTE: I, Jordon Min Do, am scribing for, and in the presence of,  Holdsworth, Alayna, PA-C. I have scribed the following portions of the note - Other sections scribed: HPI, ROS, PE.       History     Chief Complaint   Patient presents with    Abdominal Pain     Complains of light vaginal bleeding and cramping across lower abdomen x10 days.     Kina Falcon is a 38 y.o. female, with a past medical history of asthma and irregular periods, who presents to the ED with acute sharp, intermittent lower abdominal pain onset a "couple" days ago. Patient reports associated subjective fever, urinary frequency, urinary urgency, and fatigue. Patient reports her LMP was 03/04-03/09. Patient reports light spotting that is pinkish-red with clots since. Patient denies abdominal trauma. No medication PTA. No other exacerbating or alleviating factors. Patient denies nausea, vomiting, diarrhea, constipation, dysuria, decreased urination, vaginal discharge, headache, dizziness, light-headedness, or other associated symptoms. Patient reports a past surgical history of hernia repair.    The history is provided by the patient. No  was used.     Review of patient's allergies indicates:  No Known Allergies  Past Medical History:   Diagnosis Date    Asthma      Past Surgical History:   Procedure Laterality Date    HERNIA REPAIR       Family History   Problem Relation Age of Onset    Diabetes Father     Hypertension Sister      Social History     Tobacco Use    Smoking status: Heavy Smoker     Current packs/day: 1.50     Types: Cigarettes    Smokeless tobacco: Never   Substance Use Topics    Alcohol use: Yes     Comment: occasionally    Drug use: No     Review of Systems   Constitutional:  Positive for fatigue and fever (subjective). Negative for chills and diaphoresis.   Respiratory:  Negative for shortness of breath.    Cardiovascular:  Negative for chest pain. "   Gastrointestinal:  Positive for abdominal pain. Negative for constipation, diarrhea, nausea and vomiting.   Genitourinary:  Positive for frequency, menstrual problem, urgency and vaginal bleeding. Negative for decreased urine volume, difficulty urinating, dysuria, genital sores, hematuria, vaginal discharge and vaginal pain.   Musculoskeletal:  Negative for back pain and myalgias.   Neurological:  Negative for dizziness, weakness, light-headedness and headaches.   All other systems reviewed and are negative.      Physical Exam     Initial Vitals [03/14/24 0806]   BP Pulse Resp Temp SpO2   117/79 101 16 98.4 °F (36.9 °C) 100 %      MAP       --         Physical Exam    Nursing note and vitals reviewed.  Constitutional: Vital signs are normal. She appears well-developed and well-nourished. She is not diaphoretic. She is active. She does not appear ill. No distress.   HENT:   Head: Normocephalic and atraumatic.   Right Ear: External ear normal.   Left Ear: External ear normal.   Nose: Nose normal.   Eyes: Conjunctivae and EOM are normal. Pupils are equal, round, and reactive to light. Right eye exhibits no discharge. Left eye exhibits no discharge.   Neck: Phonation normal. Neck supple.   Normal range of motion.  Cardiovascular:  Normal rate and regular rhythm.           Pulmonary/Chest: Effort normal and breath sounds normal. No respiratory distress.   Abdominal: Abdomen is soft. She exhibits no distension. There is abdominal tenderness in the suprapubic area. There is no rebound and no guarding.   Genitourinary:    Uterus normal.   There is no rash, tenderness, lesion or injury on the right labia. There is no rash, tenderness, lesion or injury on the left labia. Cervix exhibits no motion tenderness, no discharge and no friability. Right adnexum displays no mass, no tenderness and no fullness. Left adnexum displays no mass, no tenderness and no fullness.    No vaginal discharge, erythema, tenderness or bleeding.  "  No erythema, tenderness or bleeding in the vagina.    No foreign body in the vagina.      No signs of injury in the vagina.      Genitourinary Comments: No active bleeding; mild old dark brown blood in vaginal canal. No clots. Cervical os closed. No CMT or friability. No discharge.      Musculoskeletal:         General: Normal range of motion.      Cervical back: Normal range of motion and neck supple.     Neurological: She is alert and oriented to person, place, and time. GCS eye subscore is 4. GCS verbal subscore is 5. GCS motor subscore is 6.   Skin: Skin is dry. Capillary refill takes less than 2 seconds.         ED Course   Procedures  Labs Reviewed   POCT URINALYSIS(INSTRUMENT) - Abnormal; Notable for the following components:       Result Value    Blood, POC UA Trace-intact (*)     All other components within normal limits   C. TRACHOMATIS/N. GONORRHOEAE BY AMP DNA   VAGINOSIS SCREEN BY DNA PROBE   POCT URINE PREGNANCY          Imaging Results    None          Medications   naproxen tablet 500 mg (500 mg Oral Given 3/14/24 0857)     Medical Decision Making  38 y.o. female, with a past medical history of asthma and irregular periods, who presents to the ED with acute sharp, intermittent lower abdominal pain onset a "couple" days ago.  Patient's chart and medical history reviewed.    Ddx:  UTI  Gonorrhea  Chlamydia  Trichomonas  Vaginal yeast infection  Bacterial vaginosis  PID  Pregnancy  DUB    Patient's vitals reviewed.  Afebrile, no respiratory distress, and nontoxic-appearing in the ED. Patient had suprapubic ttp. Upt negative. Patient given naproxen for pain. Pelvic exam showed no active bleeding; mild old dark brown blood in vaginal canal. No clots. Cervical os closed. No CMT or friability. No discharge. Unlikely PID.  Sent off wet prep and GC swabs. Discussed with patient her results will be back in 2-3 days, and she will be called with any abnormal results and sent in the proper medications. She " verbalized understanding. UA unremarkable for infection.  Discussed with patient this is most likely due to dysfunctional uterine bleeding and irregular periods, she verbalized understanding.  Patient will follow-up with her OBGYN.  Patient will be sent home with naproxen as needed for pain. Patient agrees with this plan. Discussed with her strict return precautions, she verbalized understanding. Patient is stable for discharge.         Amount and/or Complexity of Data Reviewed  Labs: ordered.    Risk  Prescription drug management.            Scribe Attestation:   Scribe #1: I performed the above scribed service and the documentation accurately describes the services I performed. I attest to the accuracy of the note.              I, Alayna Holdsworth,PA-C, personally performed the services described in this documentation.  All medical record entries made by the scribe were at my direction and in my presence.  I have reviewed the chart and agree that the record reflects my personal performance and is accurate and complete.                   Clinical Impression:  Final diagnoses:  [R10.30] Lower abdominal pain  [N93.8] DUB (dysfunctional uterine bleeding) (Primary)  [N93.9] Vaginal spotting  [N92.6] Irregular periods          ED Disposition Condition    Discharge Stable          ED Prescriptions       Medication Sig Dispense Start Date End Date Auth. Provider    naproxen (NAPROSYN) 500 MG tablet Take 1 tablet (500 mg total) by mouth 2 (two) times daily as needed (Pain). 30 tablet 3/14/2024 -- Holdsworth, Alayna, PA-C          Follow-up Information       Follow up With Specialties Details Why Contact Info    Your OBGYN                 Holdsworth, Alayna, PA-C  03/14/24 9279

## 2024-03-16 LAB
BACTERIAL VAGINOSIS DNA: POSITIVE
C TRACH DNA SPEC QL NAA+PROBE: NOT DETECTED
CANDIDA GLABRATA DNA: NEGATIVE
CANDIDA KRUSEI DNA: NEGATIVE
CANDIDA RRNA VAG QL PROBE: NEGATIVE
N GONORRHOEA DNA SPEC QL NAA+PROBE: NOT DETECTED
T VAGINALIS RRNA GENITAL QL PROBE: POSITIVE

## 2024-03-18 RX ORDER — ALBUTEROL SULFATE 90 UG/1
1-2 AEROSOL, METERED RESPIRATORY (INHALATION) EVERY 6 HOURS PRN
Qty: 8 G | Refills: 0 | Status: SHIPPED | OUTPATIENT
Start: 2024-03-18

## 2024-03-18 RX ORDER — METRONIDAZOLE 500 MG/1
500 TABLET ORAL EVERY 12 HOURS
Qty: 14 TABLET | Refills: 0 | Status: SHIPPED | OUTPATIENT
Start: 2024-03-18 | End: 2024-03-25